# Patient Record
Sex: FEMALE | Race: WHITE | NOT HISPANIC OR LATINO | Employment: FULL TIME | URBAN - METROPOLITAN AREA
[De-identification: names, ages, dates, MRNs, and addresses within clinical notes are randomized per-mention and may not be internally consistent; named-entity substitution may affect disease eponyms.]

---

## 2017-01-25 ENCOUNTER — ALLSCRIPTS OFFICE VISIT (OUTPATIENT)
Dept: OTHER | Facility: OTHER | Age: 22
End: 2017-01-25

## 2017-01-25 DIAGNOSIS — R00.2 PALPITATIONS: ICD-10-CM

## 2017-02-08 ENCOUNTER — HOSPITAL ENCOUNTER (OUTPATIENT)
Dept: NON INVASIVE DIAGNOSTICS | Facility: CLINIC | Age: 22
Discharge: HOME | End: 2017-02-08
Payer: COMMERCIAL

## 2017-02-08 DIAGNOSIS — R00.2 PALPITATIONS: ICD-10-CM

## 2017-02-08 PROCEDURE — 93306 TTE W/DOPPLER COMPLETE: CPT

## 2017-02-23 ENCOUNTER — ALLSCRIPTS OFFICE VISIT (OUTPATIENT)
Dept: OTHER | Facility: OTHER | Age: 22
End: 2017-02-23

## 2017-03-01 ENCOUNTER — ALLSCRIPTS OFFICE VISIT (OUTPATIENT)
Dept: OTHER | Facility: OTHER | Age: 22
End: 2017-03-01

## 2017-06-28 ENCOUNTER — HOSPITAL ENCOUNTER (EMERGENCY)
Facility: HOSPITAL | Age: 22
Discharge: HOME | End: 2017-06-28
Attending: EMERGENCY MEDICINE
Payer: COMMERCIAL

## 2017-06-28 VITALS
WEIGHT: 164 LBS | SYSTOLIC BLOOD PRESSURE: 121 MMHG | BODY MASS INDEX: 30.18 KG/M2 | HEIGHT: 62 IN | OXYGEN SATURATION: 99 % | HEART RATE: 68 BPM | TEMPERATURE: 98.1 F | RESPIRATION RATE: 16 BRPM | DIASTOLIC BLOOD PRESSURE: 77 MMHG

## 2017-06-28 DIAGNOSIS — B86 SCABIES: Primary | ICD-10-CM

## 2017-06-28 PROCEDURE — 99282 EMERGENCY DEPT VISIT SF MDM: CPT

## 2017-06-28 RX ORDER — PERMETHRIN 50 MG/G
CREAM TOPICAL
Qty: 60 G | Refills: 0 | Status: SHIPPED | OUTPATIENT
Start: 2017-06-28 | End: 2017-06-28

## 2017-06-28 RX ORDER — PERMETHRIN 50 MG/G
CREAM TOPICAL
Qty: 60 G | Refills: 1 | Status: SHIPPED | OUTPATIENT
Start: 2017-06-28 | End: 2018-02-15 | Stop reason: ALTCHOICE

## 2018-01-12 VITALS
BODY MASS INDEX: 27.92 KG/M2 | WEIGHT: 163.56 LBS | HEIGHT: 64 IN | SYSTOLIC BLOOD PRESSURE: 118 MMHG | DIASTOLIC BLOOD PRESSURE: 70 MMHG

## 2018-01-13 VITALS
BODY MASS INDEX: 28.36 KG/M2 | WEIGHT: 166.13 LBS | DIASTOLIC BLOOD PRESSURE: 68 MMHG | HEART RATE: 89 BPM | SYSTOLIC BLOOD PRESSURE: 110 MMHG | HEIGHT: 64 IN

## 2018-01-14 VITALS
HEIGHT: 64 IN | OXYGEN SATURATION: 98 % | WEIGHT: 163.56 LBS | DIASTOLIC BLOOD PRESSURE: 82 MMHG | HEART RATE: 96 BPM | SYSTOLIC BLOOD PRESSURE: 128 MMHG | BODY MASS INDEX: 27.92 KG/M2

## 2018-02-15 ENCOUNTER — OFFICE VISIT (OUTPATIENT)
Dept: DERMATOLOGY | Facility: CLINIC | Age: 23
End: 2018-02-15
Payer: COMMERCIAL

## 2018-02-15 DIAGNOSIS — Z13.89 SCREENING FOR SKIN CONDITION: ICD-10-CM

## 2018-02-15 DIAGNOSIS — L20.89 FLEXURAL ATOPIC DERMATITIS: Primary | ICD-10-CM

## 2018-02-15 PROCEDURE — 99203 OFFICE O/P NEW LOW 30 MIN: CPT | Performed by: DERMATOLOGY

## 2018-02-15 RX ORDER — CEPHALEXIN 250 MG/5ML
POWDER, FOR SUSPENSION ORAL
Refills: 0 | COMMUNITY
Start: 2018-02-06 | End: 2018-08-08 | Stop reason: ALTCHOICE

## 2018-02-15 RX ORDER — FLUTICASONE PROPIONATE 0.05 %
CREAM (GRAM) TOPICAL 2 TIMES DAILY
Qty: 30 G | Refills: 2 | Status: SHIPPED | OUTPATIENT
Start: 2018-02-15 | End: 2018-08-08 | Stop reason: ALTCHOICE

## 2018-02-15 RX ORDER — AZITHROMYCIN 1 G
PACKET (EA) ORAL
Refills: 0 | COMMUNITY
Start: 2017-11-13 | End: 2018-08-08 | Stop reason: ALTCHOICE

## 2018-02-15 RX ORDER — ONDANSETRON 4 MG/1
TABLET, FILM COATED ORAL
Refills: 0 | COMMUNITY
Start: 2017-11-13 | End: 2018-08-08 | Stop reason: ALTCHOICE

## 2018-02-15 NOTE — PROGRESS NOTES
[address] [facility] OF [address] [facility] DERMATOLOGY  [address] [address]     MRN: [id number] : 1995  Encounter: [id number]  Patient Information: Elida Hermelindo  Chief complaint: Hand dermatitis    History of present illness:  80-year-old female without previous history of atopy herself but with strong family history presents secondary to a hand problem which has been present since last summer since patient has been working at a   Patient notes that she washes the hands constantly  Patient complaining of itching burning of the area has been treated for scabies for this eruption without any improvement  No past medical history on file  No past surgical history on file  Social History   History   Alcohol Use    Yes     Comment: social     History   Drug Use No     History   Smoking Status    Current Every Day Smoker    Packs/day: 0 50    Types: Cigarettes   Smokeless Tobacco    Never Used     Family History   Problem Relation Age of Onset    Diabetes Maternal Grandfather     Hypertension Maternal Grandfather     Diabetes Paternal Grandmother     Hypertension Paternal Grandmother     Cancer Paternal Grandmother     Cancer Paternal Grandfather      Meds/Allergies   No Known Allergies    Meds:  Prior to Admission medications    Medication Sig Start Date End Date Taking? Authorizing Provider   azithromycin (ZITHROMAX) 1 g powder TAKE 1 PACKET (1 G TOTAL) BY MOUTH ONCE FOR 1 DOSE  17   Historical Provider, MD   cephalexin (KEFLEX) 250 mg/5 mL suspension TAKE 10 ML (500 MG TOTAL) BY MOUTH EVERY 8 (EIGHT) HOURS FOR 7 DAYS  DISCARD ANY REMAINDER 18   Historical Provider, MD   ondansetron (ZOFRAN) 4 mg tablet TAKE 1 TABLET (4 MG TOTAL) BY MOUTH ONCE FOR 1 DOSE  17   Historical Provider, MD   permethrin (ELIMITE) 5 % cream Apply cream to entire body, leave on for 8-14 hours, then rinse off, then repeat in 1 week   17   Pinky Logan Vi Deleon MD       Subjective:     Review of Systems:    General: negative for - chills, fatigue, fever,  weight gain or weight loss  Psychological: negative for - anxiety, behavioral disorder, concentration difficulties, decreased libido, depression, irritability, memory difficulties, mood swings, sleep disturbances or suicidal ideation  ENT: negative for - hearing difficulties , nasal congestion, nasal discharge, oral lesions, sinus pain, sneezing, sore throat  Allergy and Immunology: negative for - hives, insect bite sensitivity,  Hematological and Lymphatic: negative for - bleeding problems, blood clots,bruising, swollen lymph nodes  Endocrine: negative for - hair pattern changes, hot flashes, malaise/lethargy, mood swings, palpitations, polydipsia/polyuria, skin changes, temperature intolerance or unexpected weight change  Respiratory: negative for - cough, hemoptysis, orthopnea, shortness of breath, or wheezing  Cardiovascular: negative for - chest pain, dyspnea on exertion, edema,  Gastrointestinal: negative for - abdominal pain, nausea/vomiting  Genito-Urinary: negative for - dysuria, incontinence, irregular/heavy menses or urinary frequency/urgency  Musculoskeletal: negative for - gait disturbance, joint pain, joint stiffness, joint swelling, muscle pain, muscular weakness  Dermatological:  As in HPI  Neurological: negative for confusion, dizziness, headaches, impaired coordination/balance, memory loss, numbness/tingling, seizures, speech problems, tremors or weakness       Objective: There were no vitals taken for this visit      Physical Exam:    General Appearance:    Alert, cooperative, no distress   Head:    Normocephalic, without obvious abnormality, atraumatic           Skin:   A full skin exam was performed including scalp, head scalp, eyes, ears, nose, lips, neck, chest, axilla, abdomen, back, , bilateral upper extremities, bilateral lower extremities, hands, feet, fingers, toes, fingernails, and toenails overall dry scaly skin noted with especially erythema scaling some fissures noted on the dorsum of her hands nothing else atypical noted     Assessment:     1  Flexural atopic dermatitis     2  Screening for skin condition           Plan:   Atopic dermatitis we discussed the concept of this process the the exacerbation triggered by the exposure to wet and dry elements  Also that the soaps and detergents that she is exposed to probably is playing a role we discussed use of moisturizes mild soaps avoidance of irritants will try her on topical steroids suggested wearing gloves as much as possible and will recheck if no improvement noted nothing else of concern noted on exam    Ford Coffey MD  2/15/2018,9:05 AM    Portions of the record may have been created with voice recognition software   Occasional wrong word or "sound a like" substitutions may have occurred due to the inherent limitations of voice recognition software   Read the chart carefully and recognize, using context, where substitutions have occurred

## 2018-02-15 NOTE — PATIENT INSTRUCTIONS
Eczema   WHAT YOU NEED TO KNOW:   Eczema, or atopic dermatitis, is an itchy, red skin rash  It is a long-term condition that may cause flare-ups for the rest of your life  DISCHARGE INSTRUCTIONS:   Return to the emergency department if:   · You develop a fever or have red streaks going up your arm or leg  · Your rash gets more swollen, red, or hot  Contact your healthcare provider if:   · Most of your skin is red, swollen, painful, and covered with scales  · You develop bloody, red, painful crusts  · Your skin blisters and oozes white or yellow pus  · You have questions about your condition or care  Medicines:   · Medicines , such as immunosuppressants, help reduce itching, redness, pain, and swelling  They may be given as a cream or pill  You may also receive antihistamines to reduce itching, or antibiotics if you have a skin infection  · Take your medicine as directed  Contact your healthcare provider if you think your medicine is not helping or if you have side effects  Tell him of her if you are allergic to any medicine  Keep a list of the medicines, vitamins, and herbs you take  Include the amounts, and when and why you take them  Bring the list or the pill bottles to follow-up visits  Carry your medicine list with you in case of an emergency  Manage eczema:   · Do not scratch  Pat or press on your skin for relief from itching  Your symptoms will get worse if you scratch  Keep your fingernails short so you do not tear your skin if you do scratch  · Keep your skin moist   Rub lotion, cream or ointment into your skin right after a bath or shower when your skin is still damp  Ask your healthcare provider what to use and how often to use it  · Take baths or showers  with warm water for 10 minutes or less  Use mild bar soap  Ask your healthcare provider for the best soap for you to use  · Wear cotton clothes  Wear loose-fitting clothes made from cotton or cotton blends  Avoid wool  · Use a humidifier  to add moisture to the air in your home  · Avoid changes in temperature , especially activities that cause you to sweat a lot because this can cause itching  Remove blankets from your bed if you get hot while you sleep  · Avoid allergens, dust, and skin irritants  Do not let pets inside your home  Do not use perfume, fabric softener, or makeup that burns or itches  Follow up with your healthcare provider as directed:  Write down your questions so you remember to ask them during your visits  © 2017 [facility] Information is for End User's use only and may not be sold, redistributed or otherwise used for commercial purposes  All illustrations and images included in CareNotes® are the copyrighted property of A D A M , Inc  or Mahad Andrews  The above information is an  only  It is not intended as medical advice for individual conditions or treatments  Talk to your doctor, nurse or pharmacist before following any medical regimen to see if it is safe and effective for you  Atopic dermatitis we discussed the concept of this process the the exacerbation triggered by the exposure to wet and dry elements    Also that the soaps and detergents that she is exposed to probably is playing a role we discussed use of moisturizes mild soaps avoidance of irritants will try her on topical steroids suggested wearing gloves as much as possible and will recheck if no improvement noted nothing else of concern noted on exam

## 2018-02-15 NOTE — LETTER
February 15, 2018     Patient: Vinay Troy   YOB: 1995   Date of Visit: 2/15/2018       To Whom it May Concern:    Vinay Troy is under my professional care  She was seen in my office on 2/15/2018  She can return to work on 02/15/2018  If you have any questions or concerns, please don't hesitate to call           Sincerely,          Esme Lubin MD        CC: No Recipients

## 2018-03-07 ENCOUNTER — APPOINTMENT (EMERGENCY)
Dept: ULTRASOUND IMAGING | Facility: HOSPITAL | Age: 23
End: 2018-03-07
Payer: COMMERCIAL

## 2018-03-07 ENCOUNTER — HOSPITAL ENCOUNTER (EMERGENCY)
Facility: HOSPITAL | Age: 23
Discharge: HOME | End: 2018-03-07
Attending: EMERGENCY MEDICINE
Payer: COMMERCIAL

## 2018-03-07 VITALS
HEART RATE: 108 BPM | HEIGHT: 62 IN | TEMPERATURE: 97.9 F | OXYGEN SATURATION: 100 % | RESPIRATION RATE: 16 BRPM | DIASTOLIC BLOOD PRESSURE: 84 MMHG | WEIGHT: 175 LBS | SYSTOLIC BLOOD PRESSURE: 155 MMHG | BODY MASS INDEX: 32.2 KG/M2

## 2018-03-07 DIAGNOSIS — N93.9 VAGINAL BLEEDING: Primary | ICD-10-CM

## 2018-03-07 DIAGNOSIS — Z34.90 PREGNANCY: ICD-10-CM

## 2018-03-07 LAB
ABO GROUP BLD: NORMAL
B-HCG SERPL-ACNC: 4148 MIU/ML
RH BLD: POSITIVE

## 2018-03-07 PROCEDURE — 99284 EMERGENCY DEPT VISIT MOD MDM: CPT

## 2018-03-07 PROCEDURE — 86900 BLOOD TYPING SEROLOGIC ABO: CPT | Performed by: EMERGENCY MEDICINE

## 2018-03-07 PROCEDURE — 76815 OB US LIMITED FETUS(S): CPT

## 2018-03-07 PROCEDURE — 86901 BLOOD TYPING SEROLOGIC RH(D): CPT | Performed by: EMERGENCY MEDICINE

## 2018-03-07 PROCEDURE — 36415 COLL VENOUS BLD VENIPUNCTURE: CPT | Performed by: EMERGENCY MEDICINE

## 2018-03-07 PROCEDURE — 84702 CHORIONIC GONADOTROPIN TEST: CPT | Performed by: EMERGENCY MEDICINE

## 2018-03-07 NOTE — ED PROVIDER NOTES
History  Chief Complaint   Patient presents with    Vaginal Bleeding - Pregnant     pt states she is 7 wks pregnant and begin spotting this AM along with mild cramping      Patient presents to the emergency department for evaluation of vaginal spotting that began today  Patient denies abdominal pain or back pain but states she is 7 weeks pregnant by dates  She has not seen private OBGYN yet but does have her 1st appointment on March 16th  She denies other vaginal discharge or urinary complaints  Denies nausea vomiting diarrhea  Denies fever chills  Has been eating and drinking normally  Has been moving her bowels normally  She again denies abdominal or inguinal pain or other pelvic pain  Prior to Admission Medications   Prescriptions Last Dose Informant Patient Reported? Taking? azithromycin (ZITHROMAX) 1 g powder   Yes No   Sig: TAKE 1 PACKET (1 G TOTAL) BY MOUTH ONCE FOR 1 DOSE  cephalexin (KEFLEX) 250 mg/5 mL suspension   Yes No   Sig: TAKE 10 ML (500 MG TOTAL) BY MOUTH EVERY 8 (EIGHT) HOURS FOR 7 DAYS  DISCARD ANY REMAINDER   fluticasone (CUTIVATE) 0 05 % cream   No No   Sig: Apply topically 2 (two) times a day   ondansetron (ZOFRAN) 4 mg tablet   Yes No   Sig: TAKE 1 TABLET (4 MG TOTAL) BY MOUTH ONCE FOR 1 DOSE  Facility-Administered Medications: None       No past medical history on file  No past surgical history on file  Family History   Problem Relation Age of Onset    Diabetes Maternal Grandfather     Hypertension Maternal Grandfather     Diabetes Paternal Grandmother     Hypertension Paternal Grandmother     Cancer Paternal Grandmother     Cancer Paternal Grandfather      I have reviewed and agree with the history as documented      Social History   Substance Use Topics    Smoking status: Current Some Day Smoker     Packs/day: 0 50     Types: Cigarettes    Smokeless tobacco: Never Used    Alcohol use No      Comment: social        Review of Systems Constitutional: Negative  Negative for activity change, appetite change, chills, diaphoresis, fatigue and fever  HENT: Negative  Negative for congestion  Eyes: Negative  Negative for photophobia and visual disturbance  Respiratory: Negative  Negative for cough, chest tightness, shortness of breath, wheezing and stridor  Cardiovascular: Negative  Negative for chest pain, palpitations and leg swelling  Gastrointestinal: Negative  Negative for abdominal pain, anal bleeding, constipation, diarrhea, nausea, rectal pain and vomiting  Endocrine: Negative  Genitourinary: Positive for vaginal bleeding  Negative for decreased urine volume, difficulty urinating, dyspareunia, dysuria, flank pain, frequency, hematuria, pelvic pain and urgency  Musculoskeletal: Negative  Negative for back pain, neck pain and neck stiffness  Skin: Negative  Negative for rash and wound  Allergic/Immunologic: Negative  Neurological: Negative  Negative for dizziness, tremors, seizures, speech difficulty, weakness, light-headedness and numbness  Hematological: Negative  Does not bruise/bleed easily  Psychiatric/Behavioral: Negative  Physical Exam  ED Triage Vitals [03/07/18 1434]   Temperature Pulse Respirations Blood Pressure SpO2   97 9 °F (36 6 °C) (!) 108 16 155/84 100 %      Temp Source Heart Rate Source Patient Position - Orthostatic VS BP Location FiO2 (%)   Oral Monitor Sitting Right arm --      Pain Score       No Pain           Orthostatic Vital Signs  Vitals:    03/07/18 1434   BP: 155/84   Pulse: (!) 108   Patient Position - Orthostatic VS: Sitting       Physical Exam   Constitutional: She is oriented to person, place, and time  She appears well-developed and well-nourished  Nontoxic appearance without respiratory distress  Patient looks comfortable sitting upright in the stretcher  HENT:   Head: Normocephalic and atraumatic     Right Ear: External ear normal    Left Ear: External ear normal    Mouth/Throat: Oropharynx is clear and moist    Eyes: Conjunctivae and EOM are normal  Pupils are equal, round, and reactive to light  Neck: Normal range of motion  Neck supple  Cardiovascular: Normal rate, regular rhythm, normal heart sounds and intact distal pulses  Pulmonary/Chest: Effort normal and breath sounds normal  No respiratory distress  She has no wheezes  She has no rales  She exhibits no tenderness  Abdominal: Soft  Bowel sounds are normal  She exhibits no distension and no mass  There is no tenderness  There is no rebound and no guarding  No hernia  No peritoneal signs   Musculoskeletal: Normal range of motion  She exhibits no edema, tenderness or deformity  Neurological: She is alert and oriented to person, place, and time  She has normal reflexes  She displays normal reflexes  No cranial nerve deficit or sensory deficit  She exhibits normal muscle tone  Coordination normal    Skin: Skin is warm and dry  No rash noted  No erythema  No pallor  Psychiatric: She has a normal mood and affect  Her behavior is normal  Judgment and thought content normal    Nursing note and vitals reviewed        ED Medications  Medications - No data to display    Diagnostic Studies  Results Reviewed     Procedure Component Value Units Date/Time    hCG, quantitative [[id number]]  (Abnormal) Collected:  03/07/18 1456    Lab Status:  Final result Specimen:  Blood from Arm, Right Updated:  03/07/18 1542     HCG, Quant 4,148 (H) mIU/mL     Narrative:          Expected Ranges:     Approximate               Approximate HCG  Gestation age          Concentration ( mIU/mL)  _____________          ______________________   Karyna Jennings                      HCG values  0 2-1                       5-50  1-2                           2-3                         100-5000  3-4                         500-70351  4-5                         1000-28071  5-6                         [id number]  6-8 [id number]  8-12                        [id number]                 US OB pregnancy limited with transvaginal   ED Interpretation by Corinne Corolla, MD (03/07 1556)   Viable intrauterine pregnancy dated at 6+ weeks  Normal fetal cardiac activity seen  No adnexal ectopic pregnancy  This was interpreted by Radiology  by Kylee Dias (03/07 1552)                 Procedures  Procedures       Phone Contacts  ED Phone Contact    ED Course  ED Course as of Mar 07 1556   Wed Mar 07, 2018   1551 Patient is stable for discharge  Her blood type is O positive and her ultrasound shows a viable fetus with good fetal heart tones and no adnexal or ectopic pregnancies  Patient will follow up with her private OBGYN physician  [facility]  CritCare Time    Disposition  Final diagnoses:   Vaginal bleeding   Pregnancy     Time reflects when diagnosis was documented in both MDM as applicable and the Disposition within this note     Time User Action Codes Description Comment    3/7/2018  3:52 PM Elio Kessler Add [N93 9] Vaginal bleeding     3/7/2018  3:52 PM Elio Kessler Add [P73 15] Pregnancy       ED Disposition     ED Disposition Condition Comment    Discharge  Radha Mcghee discharge to home/self care  Condition at discharge: Stable        Follow-up Information     Follow up With Specialties Details Why Contact Info    Private OBGYN  Schedule an appointment as soon as possible for a visit          Patient's Medications   Discharge Prescriptions    No medications on file     No discharge procedures on file      ED Provider  Electronically Signed by           Corinne Corolla, MD  03/07/18 [address] Darnell MD  03/07/18 0488

## 2018-03-07 NOTE — DISCHARGE INSTRUCTIONS
First Trimester Vaginal Bleed   WHAT YOU NEED TO KNOW:   First trimester vaginal bleed is bleeding that occurs during the first 13 weeks of your pregnancy  Your baby's heart rate and size are good  DISCHARGE INSTRUCTIONS:   Return the emergency department if:   · You have a fever  · You have pain or cramping in your abdomen or low back  · You have severe vaginal bleeding or clotting  · You pass material that looks like tissue or large clots  Collect the material and bring it with you  Contact your healthcare provider if:  You have questions or concerns about your condition or care  Self-care:   · Keep track of bleeding  Use sanitary pads to keep track of how much vaginal bleeding you are having  Do not use tampons  Keep a record of how many pads you use each day  · Ask about activity  You may need to rest, limit certain activities, or not have sex until your symptoms are better  Follow up with your healthcare provider as directed:  Write down your questions so you remember to ask them during your visits  © 2017 [facility] Information is for End User's use only and may not be sold, redistributed or otherwise used for commercial purposes  All illustrations and images included in CareNotes® are the copyrighted property of [employer] M [employer]  or Mahad Andrews  The above information is an  only  It is not intended as medical advice for individual conditions or treatments  Talk to your doctor, nurse or pharmacist before following any medical regimen to see if it is safe and effective for you  Pregnancy   WHAT YOU NEED TO KNOW:   What do I need to know about pregnancy? A normal pregnancy lasts about 40 weeks  The first trimester lasts from your last period through the 12th week of pregnancy  The second trimester lasts from the 13th week of your pregnancy through the 23rd week  The third trimester lasts from your 24th week of pregnancy until your baby is born  If you know the date of your last period, your healthcare provider can estimate your due date  You may give birth to your baby any time from 37 weeks to 2 weeks after your due date  What is prenatal care? Prenatal care is a series of visits with your healthcare provider throughout your pregnancy  Prenatal care can help prevent problems during pregnancy and childbirth  At each prenatal visit, your healthcare provider will weigh you and check your blood pressure  Your healthcare provider will also check your baby's heartbeat and growth  You may also need the following at some visits:  · A pelvic exam  allows your healthcare provider to see your cervix (the bottom part of your uterus)  Your healthcare provider uses a speculum to gently open your vagina  He will check the size and shape of your uterus  · Blood tests  may be done to check for gestational diabetes and anemia (low iron level)  You may need other blood tests, such as blood type, Rh factor, or tests to check for birth defects  · A fetal ultrasound  shows pictures of your baby inside your uterus  It shows your baby's development  The movement and position of your baby can also be seen  Your healthcare provider may be able to tell you what your baby's gender is during the ultrasound  What can I do to have a healthy pregnancy? · Eat a variety of healthy foods  Healthy foods include fruits, vegetables, whole-grain breads, low-fat dairy foods, beans, lean meats, and fish  Drink liquids as directed  Ask how much liquid to drink each day and which liquids are best for you  Limit caffeine to less than 200 milligrams each day  Limit your intake of fish to 2 servings each week  Choose fish low in mercury such as canned light tuna, shrimp, crab, salmon, cod, or tilapia  Do not  eat fish high in mercury such as swordfish, tilefish, carlos mackerel, and shark  · Take prenatal vitamins as directed    Your need for certain vitamins and minerals, such as folic acid, increases during pregnancy  Prenatal vitamins provide some of the extra vitamins and minerals you need  Prenatal vitamins may also help to decrease the risk of certain birth defects  · Ask how much weight you should gain during your pregnancy  Too much or too little weight gain can be unhealthy for you and your baby  · Talk to your healthcare provider about exercise  Moderate exercise can help you stay fit  Your healthcare provider will help you plan an exercise program that is safe for you during pregnancy  · Do not smoke  If you smoke, it is never too late to quit  Smoking increases your risk of a miscarriage and other health problems during your pregnancy  Smoking can cause your baby to be born too early or weigh less at birth  Ask your healthcare provider for information if you need help quitting  · Do not drink alcohol  Alcohol passes from your body to your baby through the placenta  It can affect your baby's brain development and cause fetal alcohol syndrome (FAS)  FAS is a group of conditions that causes mental, behavior, and growth problems  · Talk to your healthcare provider before you take any medicines  Many medicines may harm your baby if you take them when you are pregnant  Do not take any medicines, vitamins, herbs, or supplements without first talking to your healthcare provider  Never use illegal or street drugs (such as marijuana or cocaine) while you are pregnant  What body changes may happen during my pregnancy? · Breast changes  you will experience include tenderness and tingling during the early part of your pregnancy  Your breasts will become larger  You may need to use a support bra  You may see a thin, yellow fluid, called colostrum, leak from your nipples during the second trimester  Colostrum is a liquid that changes to milk about 3 days after you give birth  · Skin changes and stretch marks  may occur during your pregnancy   You may have red marks, called stretch marks, on your skin  Stretch marks will usually fade after pregnancy  Use lotion if your skin is dry and itchy  The skin on your face, around your nipples, and below your belly button may darken  Most of the time, your skin will return to its normal color after your baby is born  · Morning sickness  is nausea and vomiting that can happen at any time of day  Avoid fatty and spicy foods  Eat small meals throughout the day instead of large meals  Ruth may help to decrease nausea  Ask your healthcare provider about other ways of decreasing nausea and vomiting  · Heartburn  may be caused by changes in your hormones during pregnancy  Your growing uterus may also push your stomach upward and force stomach acid to back up into your esophagus  Eat 4 or 5 small meals each day instead of large meals  Avoid spicy foods  Avoid eating right before bedtime  · Constipation  may develop during your pregnancy  To treat constipation, eat foods high in fiber such as fiber cereals, beans, fruits, vegetables, whole-grain breads, and prune juice  Get regular exercise and drink plenty of water  Your healthcare provider may also suggest a fiber supplement to soften your bowel movements  Talk to your healthcare provider before you use any medicines to decrease constipation  · Hemorrhoids  are enlarged veins in the rectal area  They may cause pain, itching, and bright red bleeding from your rectum  To decrease your risk of hemorrhoids, prevent constipation and do not strain to have a bowel movement  If you have hemorrhoids, soak in a tub of warm water to ease discomfort  Ask your healthcare provider how you can treat hemorrhoids  · Leg cramps and swelling  may be caused by low calcium levels or the added weight of pregnancy  Raise your legs above the level of your heart to decrease swelling  During a leg cramp, stretch or massage the muscle that has the cramp  Heat may help decrease pain and muscle spasms   Apply heat on your muscle for 20 to 30 minutes every 2 hours for as many days as directed  · Back pain  may occur as your baby grows  Do not stand for long periods of time or lift heavy items  Use good posture while you stand, squat, or bend  Wear low-heeled shoes with good support  Rest may also help to relieve back pain  Ask your healthcare provider about exercises you can do to strengthen your back muscles  What are some safety tips during pregnancy? · Avoid hot tubs and saunas  Do not use a hot tub or sauna while you are pregnant, especially during your first trimester  Hot tubs and saunas may raise your baby's temperature and increase the risk of birth defects  · Avoid toxoplasmosis  This is an infection caused by eating raw meat or being around infected cat feces  It can cause birth defects, miscarriages, and other problems  Wash your hands after you touch raw meat  Make sure any meat is well-cooked before you eat it  Avoid raw eggs and unpasteurized milk  Use gloves or ask someone else to clean your cat's litter box while you are pregnant  · Ask your healthcare provider about travel  The most comfortable time to travel is during the second trimester  Ask your healthcare provider if you can travel after 36 weeks  You may not be able to travel in an airplane after 36 weeks  He may also recommend that you avoid long road trips  When should I seek immediate care? · You develop a severe headache that does not go away  · You have new or increased vision changes, such as blurred or spotted vision  · You have new or increased swelling in your face or hands  · You have pain or cramping in your abdomen or low back  · You have vaginal bleeding  When should I contact my healthcare provider? · You have abdominal cramps, pressure, or tightening  · You have a change in vaginal discharge  · You cannot keep food or drinks down, and you are losing weight      · You have chills or a fever     · You have vaginal itching, burning, or pain  · You have yellow, green, white, or foul-smelling vaginal discharge  · You have pain or burning when you urinate, less urine than usual, or pink or bloody urine  · You have questions or concerns about your condition or care  CARE AGREEMENT:   You have the right to help plan your care  Learn about your health condition and how it may be treated  Discuss treatment options with your caregivers to decide what care you want to receive  You always have the right to refuse treatment  The above information is an  only  It is not intended as medical advice for individual conditions or treatments  Talk to your doctor, nurse or pharmacist before following any medical regimen to see if it is safe and effective for you  © 2017 [address]  Information is for End User's use only and may not be sold, redistributed or otherwise used for commercial purposes  All illustrations and images included in CareNotes® are the copyrighted property of [employer] M [employer]  or Mahad Andrews

## 2018-04-06 ENCOUNTER — HOSPITAL ENCOUNTER (EMERGENCY)
Facility: HOSPITAL | Age: 23
Discharge: HOME | End: 2018-04-07
Attending: EMERGENCY MEDICINE | Admitting: EMERGENCY MEDICINE
Payer: COMMERCIAL

## 2018-04-06 DIAGNOSIS — O20.0 THREATENED MISCARRIAGE: Primary | ICD-10-CM

## 2018-04-06 DIAGNOSIS — R10.2 PELVIC CRAMPING: ICD-10-CM

## 2018-04-06 DIAGNOSIS — O20.9 VAGINAL BLEEDING BEFORE 22 WEEKS GESTATION: ICD-10-CM

## 2018-04-07 VITALS
HEIGHT: 62 IN | SYSTOLIC BLOOD PRESSURE: 120 MMHG | BODY MASS INDEX: 32.2 KG/M2 | TEMPERATURE: 98 F | RESPIRATION RATE: 18 BRPM | HEART RATE: 74 BPM | WEIGHT: 175 LBS | OXYGEN SATURATION: 98 % | DIASTOLIC BLOOD PRESSURE: 78 MMHG

## 2018-04-07 LAB
B-HCG SERPL-ACNC: 8181 MIU/ML
BACTERIA UR QL AUTO: ABNORMAL /HPF
BILIRUB UR QL STRIP: NEGATIVE
CLARITY UR: CLEAR
COLOR UR: YELLOW
GLUCOSE UR STRIP-MCNC: NEGATIVE MG/DL
HGB UR QL STRIP.AUTO: ABNORMAL
KETONES UR STRIP-MCNC: NEGATIVE MG/DL
LEUKOCYTE ESTERASE UR QL STRIP: NEGATIVE
NITRITE UR QL STRIP: NEGATIVE
NON-SQ EPI CELLS URNS QL MICRO: ABNORMAL /HPF
PH UR STRIP.AUTO: 7 [PH] (ref 4.5–8)
PROT UR STRIP-MCNC: NEGATIVE MG/DL
RBC #/AREA URNS AUTO: ABNORMAL /HPF
SP GR UR STRIP.AUTO: 1.01 (ref 1–1.03)
UROBILINOGEN UR QL STRIP.AUTO: 0.2 E.U./DL
WBC #/AREA URNS AUTO: ABNORMAL /HPF

## 2018-04-07 PROCEDURE — 36415 COLL VENOUS BLD VENIPUNCTURE: CPT | Performed by: EMERGENCY MEDICINE

## 2018-04-07 PROCEDURE — 84702 CHORIONIC GONADOTROPIN TEST: CPT | Performed by: EMERGENCY MEDICINE

## 2018-04-07 PROCEDURE — 81001 URINALYSIS AUTO W/SCOPE: CPT | Performed by: EMERGENCY MEDICINE

## 2018-04-07 PROCEDURE — 99284 EMERGENCY DEPT VISIT MOD MDM: CPT

## 2018-04-07 NOTE — DISCHARGE INSTRUCTIONS
Threatened Miscarriage   WHAT YOU NEED TO KNOW:   A threatened miscarriage occurs when you have vaginal bleeding within the first 20 weeks of pregnancy  It means that a miscarriage may happen  A threatened miscarriage may also be called a threatened   DISCHARGE INSTRUCTIONS:   Seek care immediately if:   · You feel weak or faint  · Your pain or cramping in your abdomen or back gets worse  · You have vaginal bleeding that soaks 1 or more pads in an hour  · You pass material that looks like tissue or large clots  Contact your healthcare provider or obstetrician if:   · You have a fever  · You have trouble urinating, burning when you urinate, or feel a need to urinate often  · You have new or worsening vaginal bleeding  · You have vaginal pain or itching, or vaginal discharge that is yellow, green, or foul-smelling  · You have questions or concerns about your condition or care  Self-care: The following may help you manage your symptoms and decrease your risk for a miscarriage:  · Do not put anything in your vagina  Do not have sex, douche, or use tampons  These actions may increase your risk for infection and miscarriage  · Rest as directed  Do not exercise or do strenuous activities  These activities may cause  labor or miscarriage  Ask your healthcare provider what activities are okay to do  Stay healthy during pregnancy:   · Eat a variety of healthy foods  Healthy foods can help you get extra protein, water, and calories that you need while you are pregnant  Healthy foods include fruits, vegetables, whole-grain breads, low-fat dairy products, beans, lean meats, and fish  Avoid raw or undercooked meat and fish  Ask your healthcare provider if you need a special diet  · Take prenatal vitamins as directed  These help you get the right amount of vitamins and minerals  They may also decrease the risk of certain birth defects      · Do not drink alcohol or use illegal drugs  These can increase your risk for a miscarriage or harm your baby  · Do not smoke  Nicotine and other chemicals in cigarettes and cigars can harm your baby and cause miscarriage or  labor  Ask your healthcare provider for information if you currently smoke and need help to quit  E-cigarettes or smokeless tobacco still contain nicotine  Do not use these products  · Decrease your risk for an infection  Always wash your hands before eating or preparing meals  Do not spend time with people who are sick  Ask your healthcare provider if you need immunizations such as the flu or hepatitis B vaccine  Immunizations may decrease your risk for infections that could cause a miscarriage  · Manage your medical conditions  Keep your blood pressure and blood sugars under control  Maintain a healthy weight during pregnancy  Follow up with your obstetrician as directed: You may need to see your obstetrician frequently for ultrasounds or blood tests  Write down your questions so you remember to ask them during your visits  ©  [address] [employer] Information is for End User's use only and may not be sold, redistributed or otherwise used for commercial purposes  All illustrations and images included in CareNotes® are the copyrighted property of [employer]  or Mahad Andrews  The above information is an  only  It is not intended as medical advice for individual conditions or treatments  Talk to your doctor, nurse or pharmacist before following any medical regimen to see if it is safe and effective for you  Miscarriage   WHAT YOU NEED TO KNOW:   A miscarriage is the loss of a fetus within the first 20 weeks of pregnancy  A miscarriage may also be called a spontaneous  or an early pregnancy loss  DISCHARGE INSTRUCTIONS:   Seek care immediately if:   · You have foul-smelling drainage or pus coming from your vagina      · You have heavy vaginal bleeding and soak 1 pad or more in an hour  · You have severe abdominal pain  · You feel like your heart is beating faster than normal      · You feel extremely weak or dizzy  Contact your healthcare provider if:   · You have a fever greater than 100 4°F or chills  · You have extreme sadness, grief, or feel unable to cope with what has happened  · You have questions or concerns about your condition or care  Self-care:   · Do not put anything in your vagina for 2 weeks or as directed  Do not use tampons, douche, or have sex  These actions can cause infection and pain  · Use sanitary pads as needed  You may have light bleeding or spotting for 2 weeks  · Do not take a bath or go swimming for 2 weeks or as directed  These actions may increase your risk for an infection  Take showers only  · Rest as needed  Slowly start to do more each day  Return to your daily activities as directed  · Talk to your healthcare provider about birth control  If you would like to prevent another pregnancy, ask your healthcare provider which type of birth control is best for you  · Join a support group or therapy to help you cope  A miscarriage may be very difficult for you, your partner, and other members of your family  There is no right way to feel after a miscarriage  You may feel overwhelming grief or other emotions  It may be helpful to talk to a friend, family member, or counselor about your feelings  You may worry that you could have another miscarriage  Talk to your healthcare provider about your concerns  He may be able to help you reduce the risk for another miscarriage  He may also help you find ways to cope with grief  For more information:   · The [address] [place] [address] Obstetricians and Gynecologists   [facility]  [address]  Phone: [phone]  Phone: [phone]  Web Address: [email or web address]  [email or web address]  · [facility] [address] [facility]  Web Address: [email or web address]-social  Follow up with your healthcare provider as directed: You may need to see your healthcare provider for blood tests or an ultrasound  Write down your questions so you remember to ask them during your visits  © 2017 [address] [employer] Information is for End User's use only and may not be sold, redistributed or otherwise used for commercial purposes  All illustrations and images included in CareNotes® are the copyrighted property of [employer]  or Mahad Andrews  The above information is an  only  It is not intended as medical advice for individual conditions or treatments  Talk to your doctor, nurse or pharmacist before following any medical regimen to see if it is safe and effective for you

## 2018-08-05 ENCOUNTER — HOSPITAL ENCOUNTER (EMERGENCY)
Facility: HOSPITAL | Age: 23
Discharge: HOME | End: 2018-08-05
Attending: EMERGENCY MEDICINE
Payer: COMMERCIAL

## 2018-08-05 VITALS
RESPIRATION RATE: 18 BRPM | HEART RATE: 93 BPM | TEMPERATURE: 98.4 F | SYSTOLIC BLOOD PRESSURE: 143 MMHG | DIASTOLIC BLOOD PRESSURE: 72 MMHG | OXYGEN SATURATION: 98 %

## 2018-08-05 DIAGNOSIS — L02.31 CELLULITIS AND ABSCESS OF BUTTOCK: Primary | ICD-10-CM

## 2018-08-05 DIAGNOSIS — L03.317 CELLULITIS AND ABSCESS OF BUTTOCK: Primary | ICD-10-CM

## 2018-08-05 PROCEDURE — 99282 EMERGENCY DEPT VISIT SF MDM: CPT

## 2018-08-05 RX ORDER — SULFAMETHOXAZOLE AND TRIMETHOPRIM 800; 160 MG/1; MG/1
1 TABLET ORAL ONCE
Status: COMPLETED | OUTPATIENT
Start: 2018-08-05 | End: 2018-08-05

## 2018-08-05 RX ORDER — CEPHALEXIN 250 MG/5ML
500 POWDER, FOR SUSPENSION ORAL ONCE
Status: COMPLETED | OUTPATIENT
Start: 2018-08-05 | End: 2018-08-05

## 2018-08-05 RX ORDER — SULFAMETHOXAZOLE AND TRIMETHOPRIM 800; 160 MG/1; MG/1
1 TABLET ORAL 2 TIMES DAILY
Qty: 14 TABLET | Refills: 0 | Status: SHIPPED | OUTPATIENT
Start: 2018-08-05 | End: 2018-08-12

## 2018-08-05 RX ORDER — CEPHALEXIN 250 MG/5ML
500 POWDER, FOR SUSPENSION ORAL EVERY 6 HOURS SCHEDULED
Qty: 100 ML | Refills: 0 | Status: SHIPPED | OUTPATIENT
Start: 2018-08-05 | End: 2018-08-12

## 2018-08-05 RX ADMIN — CEPHALEXIN 500 MG: 250 POWDER, FOR SUSPENSION ORAL at 22:00

## 2018-08-05 RX ADMIN — SULFAMETHOXAZOLE AND TRIMETHOPRIM 1 TABLET: 800; 160 TABLET ORAL at 21:59

## 2018-08-06 NOTE — DISCHARGE INSTRUCTIONS
Abscess   WHAT YOU NEED TO KNOW:   A warm compress may help your abscess drain  Your healthcare provider may make a cut in the abscess so it can drain  You may need surgery to remove an abscess that is on your hands or buttocks  DISCHARGE INSTRUCTIONS:   Return to the emergency department if:   · The area around your abscess becomes very painful, warm, or has red streaks  · You have a fever and chills  · Your heart is beating faster than usual      · You feel faint or confused  Contact your healthcare provider if:   · Your abscess gets bigger or does not get better  · Your abscess returns  · You have questions or concerns about your condition or care  Medicines: You may  need any of the following:  · Antibiotics  help treat a bacterial infection  · Acetaminophen  decreases pain and fever  It is available without a doctor's order  Ask how much to take and how often to take it  Follow directions  Acetaminophen can cause liver damage if not taken correctly  · NSAIDs , such as ibuprofen, help decrease swelling, pain, and fever  This medicine is available with or without a doctor's order  NSAIDs can cause stomach bleeding or kidney problems in certain people  If you take blood thinner medicine, always ask your healthcare provider if NSAIDs are safe for you  Always read the medicine label and follow directions  · Take your medicine as directed  Contact your healthcare provider if you think your medicine is not helping or if you have side effects  Tell him or her if you are allergic to any medicine  Keep a list of the medicines, vitamins, and herbs you take  Include the amounts, and when and why you take them  Bring the list or the pill bottles to follow-up visits  Carry your medicine list with you in case of an emergency  Self-care:   · Apply a warm compress to your abscess  This will help it open and drain  Wet a washcloth in warm, but not hot, water  Apply the compress for 10 minutes  Repeat this 4 times each day  Do not  press on an abscess or try to open it with a needle  You may push the bacteria deeper or into your blood  · Do not share your clothes, towels, or sheets with anyone  This can spread the infection to others  · Wash your hands often  This can help prevent the spread of germs  Use soap and water or an alcohol-based hand rub  Care for your wound after it is drained:   · Care for your wound as directed  If your healthcare provider says it is okay, carefully remove the bandage and gauze packing  You may need to soak the gauze to get it out of your wound  Clean your wound and the area around it as directed  Dry the area and put on new, clean bandages  Change your bandages when they get wet or dirty  · Ask your healthcare provider how to change the gauze in your wound  Keep track of how many pieces of gauze are placed inside the wound  Do not put too much packing in the wound  Do not pack the gauze too tightly in your wound  Follow up with your healthcare provider in 1 to 3 days: You may need to have your packing removed or your bandage changed  Write down your questions so you remember to ask them during your visits  © 2017 [employer] [facility] Information is for End User's use only and may not be sold, redistributed or otherwise used for commercial purposes  All illustrations and images included in CareNotes® are the copyrighted property of [employer]  or Mahad Andrews  The above information is an  only  It is not intended as medical advice for individual conditions or treatments  Talk to your doctor, nurse or pharmacist before following any medical regimen to see if it is safe and effective for you  Cellulitis   WHAT YOU NEED TO KNOW:   Cellulitis is a skin infection caused by bacteria  Cellulitis may go away on its own or you may need treatment   Your healthcare provider may draw a Andreafski around the outside edges of your cellulitis  If your cellulitis spreads, your healthcare provider will see it outside of the [place]  DISCHARGE INSTRUCTIONS:   Call 911 if:   · You have sudden trouble breathing or chest pain  Return to the emergency department if:   · Your wound gets larger and more painful  · You feel a crackling under your skin when you touch it  · You have purple dots or bumps on your skin, or you see bleeding under your skin  · You have new swelling and pain in your legs  · The red, warm, swollen area gets larger  · You see red streaks coming from the infected area  Contact your healthcare provider if:   · You have a fever  · Your fever or pain does not go away or gets worse  · The area does not get smaller after 2 days of antibiotics  · Your skin is flaking or peeling off  · You have questions or concerns about your condition or care  Medicines:   · Antibiotics  help treat the bacterial infection  · NSAIDs , such as ibuprofen, help decrease swelling, pain, and fever  NSAIDs can cause stomach bleeding or kidney problems in certain people  If you take blood thinner medicine, always ask if NSAIDs are safe for you  Always read the medicine label and follow directions  Do not give these medicines to children under 10months of age without direction from your child's healthcare provider  · Acetaminophen  decreases pain and fever  It is available without a doctor's order  Ask how much to take and how often to take it  Follow directions  Read the labels of all other medicines you are using to see if they also contain acetaminophen, or ask your doctor or pharmacist  Acetaminophen can cause liver damage if not taken correctly  Do not use more than 4 grams (4,000 milligrams) total of acetaminophen in one day  · Take your medicine as directed  Contact your healthcare provider if you think your medicine is not helping or if you have side effects   Tell him or her if you are allergic to any medicine  Keep a list of the medicines, vitamins, and herbs you take  Include the amounts, and when and why you take them  Bring the list or the pill bottles to follow-up visits  Carry your medicine list with you in case of an emergency  Self-care:   · Elevate the area above the level of your heart  as often as you can  This will help decrease swelling and pain  Prop the area on pillows or blankets to keep it elevated comfortably  · Clean the area daily until the wound scabs over  Gently wash the area with soap and water  Pat dry  Use dressings as directed  · Place cool or warm, wet cloths on the area as directed  Use clean cloths and clean water  Leave it on the area until the cloth is room temperature  Pat the area dry with a clean, dry cloth  The cloths may help decrease pain  Prevent cellulitis:   · Do not scratch bug bites or areas of injury  You increase your risk for cellulitis by scratching these areas  · Do not share personal items, such as towels, clothing, and razors  · Clean exercise equipment  with germ-killing  before and after you use it  · Wash your hands often  Use soap and water  Wash your hands after you use the bathroom, change a child's diapers, or sneeze  Wash your hands before you prepare or eat food  Use lotion to prevent dry, cracked skin  · Wear pressure stockings as directed  You may be told to wear the stockings if you have peripheral edema  The stockings improve blood flow and decrease swelling  · Treat athlete's foot  This can help prevent the spread of a bacterial skin infection  Follow up with your healthcare provider within 3 days, or as directed: Your healthcare provider will check if your cellulitis is getting better  You may need different medicine  Write down your questions so you remember to ask them during your visits    © 2017 [employer] Information is for End User's use only and may not be sold, redistributed or otherwise used for commercial purposes  All illustrations and images included in CareNotes® are the copyrighted property of [employer]  or Mahad Andrews  The above information is an  only  It is not intended as medical advice for individual conditions or treatments  Talk to your doctor, nurse or pharmacist before following any medical regimen to see if it is safe and effective for you

## 2018-08-07 ENCOUNTER — HOSPITAL ENCOUNTER (EMERGENCY)
Facility: HOSPITAL | Age: 23
Discharge: HOME | End: 2018-08-07
Attending: EMERGENCY MEDICINE | Admitting: EMERGENCY MEDICINE
Payer: COMMERCIAL

## 2018-08-07 ENCOUNTER — APPOINTMENT (EMERGENCY)
Dept: CT IMAGING | Facility: HOSPITAL | Age: 23
End: 2018-08-07
Payer: COMMERCIAL

## 2018-08-07 VITALS
SYSTOLIC BLOOD PRESSURE: 117 MMHG | TEMPERATURE: 99.7 F | HEIGHT: 62 IN | OXYGEN SATURATION: 100 % | HEART RATE: 82 BPM | WEIGHT: 175.04 LBS | BODY MASS INDEX: 32.21 KG/M2 | DIASTOLIC BLOOD PRESSURE: 72 MMHG | RESPIRATION RATE: 18 BRPM

## 2018-08-07 DIAGNOSIS — L02.31 ABSCESS OF RIGHT BUTTOCK: Primary | ICD-10-CM

## 2018-08-07 LAB
ANION GAP SERPL CALCULATED.3IONS-SCNC: 8 MMOL/L (ref 4–13)
BASOPHILS # BLD AUTO: 0.04 THOUSANDS/ΜL (ref 0–0.1)
BASOPHILS NFR BLD AUTO: 0 % (ref 0–1)
BUN SERPL-MCNC: 19 MG/DL (ref 5–25)
CALCIUM SERPL-MCNC: 9 MG/DL (ref 8.3–10.1)
CHLORIDE SERPL-SCNC: 101 MMOL/L (ref 100–108)
CO2 SERPL-SCNC: 29 MMOL/L (ref 21–32)
CREAT SERPL-MCNC: 0.91 MG/DL (ref 0.6–1.3)
EOSINOPHIL # BLD AUTO: 0.06 THOUSAND/ΜL (ref 0–0.61)
EOSINOPHIL NFR BLD AUTO: 1 % (ref 0–6)
ERYTHROCYTE [DISTWIDTH] IN BLOOD BY AUTOMATED COUNT: 12.6 % (ref 11.6–15.1)
EXT PREG TEST URINE: NEGATIVE
GFR SERPL CREATININE-BSD FRML MDRD: 90 ML/MIN/1.73SQ M
GLUCOSE SERPL-MCNC: 96 MG/DL (ref 65–140)
HCT VFR BLD AUTO: 45.6 % (ref 34.8–46.1)
HGB BLD-MCNC: 14.4 G/DL (ref 11.5–15.4)
IMM GRANULOCYTES # BLD AUTO: 0.08 THOUSAND/UL (ref 0–0.2)
IMM GRANULOCYTES NFR BLD AUTO: 1 % (ref 0–2)
LYMPHOCYTES # BLD AUTO: 1.71 THOUSANDS/ΜL (ref 0.6–4.47)
LYMPHOCYTES NFR BLD AUTO: 17 % (ref 14–44)
MCH RBC QN AUTO: 28.9 PG (ref 26.8–34.3)
MCHC RBC AUTO-ENTMCNC: 31.6 G/DL (ref 31.4–37.4)
MCV RBC AUTO: 92 FL (ref 82–98)
MONOCYTES # BLD AUTO: 0.85 THOUSAND/ΜL (ref 0.17–1.22)
MONOCYTES NFR BLD AUTO: 9 % (ref 4–12)
NEUTROPHILS # BLD AUTO: 7.21 THOUSANDS/ΜL (ref 1.85–7.62)
NEUTS SEG NFR BLD AUTO: 72 % (ref 43–75)
NRBC BLD AUTO-RTO: 0 /100 WBCS
PLATELET # BLD AUTO: 183 THOUSANDS/UL (ref 149–390)
PMV BLD AUTO: 9.2 FL (ref 8.9–12.7)
POTASSIUM SERPL-SCNC: 3.5 MMOL/L (ref 3.5–5.3)
RBC # BLD AUTO: 4.98 MILLION/UL (ref 3.81–5.12)
SODIUM SERPL-SCNC: 138 MMOL/L (ref 136–145)
WBC # BLD AUTO: 9.95 THOUSAND/UL (ref 4.31–10.16)

## 2018-08-07 PROCEDURE — 99284 EMERGENCY DEPT VISIT MOD MDM: CPT

## 2018-08-07 PROCEDURE — 85025 COMPLETE CBC W/AUTO DIFF WBC: CPT | Performed by: PHYSICIAN ASSISTANT

## 2018-08-07 PROCEDURE — 81025 URINE PREGNANCY TEST: CPT | Performed by: PHYSICIAN ASSISTANT

## 2018-08-07 PROCEDURE — 87186 SC STD MICRODIL/AGAR DIL: CPT | Performed by: PHYSICIAN ASSISTANT

## 2018-08-07 PROCEDURE — 87205 SMEAR GRAM STAIN: CPT | Performed by: PHYSICIAN ASSISTANT

## 2018-08-07 PROCEDURE — 36415 COLL VENOUS BLD VENIPUNCTURE: CPT | Performed by: PHYSICIAN ASSISTANT

## 2018-08-07 PROCEDURE — 87147 CULTURE TYPE IMMUNOLOGIC: CPT | Performed by: PHYSICIAN ASSISTANT

## 2018-08-07 PROCEDURE — 80048 BASIC METABOLIC PNL TOTAL CA: CPT | Performed by: PHYSICIAN ASSISTANT

## 2018-08-07 PROCEDURE — 87070 CULTURE OTHR SPECIMN AEROBIC: CPT | Performed by: PHYSICIAN ASSISTANT

## 2018-08-07 RX ORDER — LIDOCAINE HYDROCHLORIDE AND EPINEPHRINE 10; 10 MG/ML; UG/ML
10 INJECTION, SOLUTION INFILTRATION; PERINEURAL ONCE
Status: COMPLETED | OUTPATIENT
Start: 2018-08-07 | End: 2018-08-07

## 2018-08-07 RX ORDER — CEPHALEXIN 500 MG/1
500 CAPSULE ORAL 3 TIMES DAILY
Qty: 15 CAPSULE | Refills: 0 | Status: SHIPPED | OUTPATIENT
Start: 2018-08-07 | End: 2018-08-07

## 2018-08-07 RX ORDER — CEPHALEXIN 250 MG/5ML
10 POWDER, FOR SUSPENSION ORAL 3 TIMES DAILY
Qty: 150 ML | Refills: 0 | Status: SHIPPED | OUTPATIENT
Start: 2018-08-07 | End: 2018-08-08 | Stop reason: ALTCHOICE

## 2018-08-07 RX ORDER — SULFAMETHOXAZOLE AND TRIMETHOPRIM 800; 160 MG/1; MG/1
1 TABLET ORAL 2 TIMES DAILY
Qty: 10 TABLET | Refills: 0 | Status: SHIPPED | OUTPATIENT
Start: 2018-08-07 | End: 2018-08-12

## 2018-08-07 RX ORDER — CHLORHEXIDINE GLUCONATE 4 G/100ML
1 SOLUTION TOPICAL DAILY PRN
Qty: 120 ML | Refills: 0 | Status: SHIPPED | OUTPATIENT
Start: 2018-08-07 | End: 2020-02-10 | Stop reason: ALTCHOICE

## 2018-08-07 RX ORDER — OXYCODONE HYDROCHLORIDE AND ACETAMINOPHEN 5; 325 MG/1; MG/1
1 TABLET ORAL EVERY 6 HOURS PRN
Qty: 12 TABLET | Refills: 0 | Status: SHIPPED | OUTPATIENT
Start: 2018-08-07 | End: 2018-08-10

## 2018-08-07 RX ORDER — CEPHALEXIN 250 MG/5ML
10 POWDER, FOR SUSPENSION ORAL 3 TIMES DAILY
Qty: 150 ML | Refills: 0 | Status: SHIPPED | OUTPATIENT
Start: 2018-08-07 | End: 2018-08-12

## 2018-08-07 RX ADMIN — IOHEXOL 100 ML: 350 INJECTION, SOLUTION INTRAVENOUS at 13:41

## 2018-08-07 RX ADMIN — LIDOCAINE HYDROCHLORIDE,EPINEPHRINE BITARTRATE 10 ML: 10; .01 INJECTION, SOLUTION INFILTRATION; PERINEURAL at 14:28

## 2018-08-07 NOTE — ED PROVIDER NOTES
History  Chief Complaint   Patient presents with    Cellulitis     Patient states she was seen here on Sunday, diagnosed with cellulits, is currently taking PO antibiotics with no improvement  Patient states cellulitis is spreading  72-year-old female with no significant past medical history presents to the emergency department with chief complaint of redness and swelling to right buttock  Onset of symptoms reported as 4 days ago  Location of symptoms reported as the right buttock  Quality is reported as localized redness swelling and pressure  Severity is reported as moderate  Associated symptoms:  Denies fevers or chills  Denies nausea or vomiting  Denies diarrhea or constipation  Denies local trauma  Denies lower extremity paralysis paresthesias or weakness  Denies low back pain  Denies urinary retention  Denies bowel or bladder incontinence  Modifying factors:  Patient reports sitting or local pressure on the area exacerbates pain  Context:  Patient reports she was seen in the emergency department for similar symptoms 3 days ago, she reports she had an ultrasound of the area which showed an abscess which was described to be too early to drain  She reports she has been taking antibiotics for the past 2 days but the redness, swelling and pain are increasing  She denies prior similar episodes in the past   She denies history of diabetes  She is a cigarette smoker  Denies recent sick contacts with similar symptoms  She does report that she has recently also had small raised red pustules to her bilateral lower extremities  Since starting the antibiotics these have improved  Medical summary:  Reviewed past visits via epic: patient last seen in ED on 8/5/2018 for initial evaluation of cellulitis of buttock  History provided by:  Patient and significant other   used: No        Prior to Admission Medications   Prescriptions Last Dose Informant Patient Reported? Taking? azithromycin (ZITHROMAX) 1 g powder   Yes No   Sig: TAKE 1 PACKET (1 G TOTAL) BY MOUTH ONCE FOR 1 DOSE  cephalexin (KEFLEX) 250 mg/5 mL suspension   Yes No   Sig: TAKE 10 ML (500 MG TOTAL) BY MOUTH EVERY 8 (EIGHT) HOURS FOR 7 DAYS  DISCARD ANY REMAINDER   cephalexin (KEFLEX) 250 mg/5 mL suspension   No No   Sig: Take 10 mL (500 mg total) by mouth every 6 (six) hours for 7 days   fluticasone (CUTIVATE) 0 05 % cream   No No   Sig: Apply topically 2 (two) times a day   ondansetron (ZOFRAN) 4 mg tablet   Yes No   Sig: TAKE 1 TABLET (4 MG TOTAL) BY MOUTH ONCE FOR 1 DOSE    sulfamethoxazole-trimethoprim (BACTRIM DS) 800-160 mg per tablet   No No   Sig: Take 1 tablet by mouth 2 (two) times a day for 7 days smx-tmp DS (BACTRIM) 800-160 mg tabs (1tab q12 D10)      Facility-Administered Medications: None       History reviewed  No pertinent past medical history  History reviewed  No pertinent surgical history  Family History   Problem Relation Age of Onset    Diabetes Maternal Grandfather     Hypertension Maternal Grandfather     Diabetes Paternal Grandmother     Hypertension Paternal Grandmother     Cancer Paternal Grandmother     Cancer Paternal Grandfather      I have reviewed and agree with the history as documented  Social History   Substance Use Topics    Smoking status: Current Some Day Smoker     Packs/day: 0 50     Types: Cigarettes    Smokeless tobacco: Never Used    Alcohol use No      Comment: social        Review of Systems   Constitutional: Negative for activity change, appetite change, chills, diaphoresis, fatigue and fever  HENT: Negative for congestion, dental problem, drooling, ear discharge, ear pain, facial swelling, hearing loss, mouth sores, nosebleeds, postnasal drip, rhinorrhea, sinus pain, sinus pressure, sneezing, sore throat, tinnitus, trouble swallowing and voice change  Eyes: Negative for photophobia, pain, discharge, redness and itching     Respiratory: Negative for apnea, cough, choking, chest tightness, shortness of breath, wheezing and stridor  Cardiovascular: Negative for chest pain, palpitations and leg swelling  Gastrointestinal: Negative for abdominal distention, abdominal pain, anal bleeding, blood in stool, constipation, diarrhea, nausea, rectal pain and vomiting  Endocrine: Negative for cold intolerance, heat intolerance, polydipsia, polyphagia and polyuria  Genitourinary: Negative for decreased urine volume, difficulty urinating, dysuria, flank pain, frequency, hematuria, menstrual problem, pelvic pain, urgency, vaginal bleeding, vaginal discharge and vaginal pain  Musculoskeletal: Negative for arthralgias, back pain, gait problem, joint swelling, myalgias, neck pain and neck stiffness  Skin: Positive for wound  Negative for color change, pallor and rash  Allergic/Immunologic: Negative for environmental allergies, food allergies and immunocompromised state  Neurological: Negative for dizziness, tremors, seizures, syncope, facial asymmetry, speech difficulty, weakness, light-headedness, numbness and headaches  Hematological: Negative for adenopathy  Does not bruise/bleed easily  Psychiatric/Behavioral: Negative for agitation, confusion, decreased concentration and hallucinations  The patient is not nervous/anxious  All other systems reviewed and are negative  Physical Exam  Physical Exam   Constitutional: She is oriented to person, place, and time  She appears well-developed and well-nourished  No distress  /72 (BP Location: Right arm)   Pulse 82   Temp 99 7 °F (37 6 °C) (Oral)   Resp 18   Ht 5' 2" (1 575 m)   Wt 79 4 kg (175 lb 0 7 oz)   LMP 07/21/2018 (Exact Date)   SpO2 100%   BMI 32 02 kg/m²    HENT:   Head: Normocephalic and atraumatic  Right Ear: External ear normal    Left Ear: External ear normal    Nose: Nose normal    Mouth/Throat: Oropharynx is clear and moist  No oropharyngeal exudate     Eyes: Conjunctivae and EOM are normal  Pupils are equal, round, and reactive to light  Right eye exhibits no discharge  Left eye exhibits no discharge  No scleral icterus  Neck: Normal range of motion  Neck supple  No JVD present  No tracheal deviation present  Cardiovascular: Normal rate, regular rhythm and intact distal pulses  Pulmonary/Chest: Effort normal and breath sounds normal  No respiratory distress  She has no wheezes  She exhibits no tenderness  Abdominal: Soft  Bowel sounds are normal  She exhibits no distension and no mass  There is no tenderness  There is no rebound and no guarding  No hernia  Musculoskeletal: Normal range of motion  She exhibits no edema, tenderness or deformity  Lymphadenopathy:     She has no cervical adenopathy  Neurological: She is alert and oriented to person, place, and time  She displays normal reflexes  No cranial nerve deficit or sensory deficit  She exhibits normal muscle tone  Coordination normal    Skin: Skin is warm and dry  Capillary refill takes less than 2 seconds  No rash noted  She is not diaphoretic  There is erythema  No pallor  There is a circular area 3 5 cm area of erythema, warmth induration fluctuance and swelling with central pustule consistent with abscess present to inferior aspect of right buttock  Does not appear perianal   No drainage  There are a few small scattered pustules associated with hair follicles present to bilateral lower legs  No petechiae, no vesicles  Psychiatric: She has a normal mood and affect  Her behavior is normal  Judgment and thought content normal    Nursing note and vitals reviewed        Vital Signs  ED Triage Vitals   Temperature Pulse Respirations Blood Pressure SpO2   08/07/18 1142 08/07/18 1142 08/07/18 1142 08/07/18 1142 08/07/18 1142   99 7 °F (37 6 °C) 90 18 123/78 99 %      Temp Source Heart Rate Source Patient Position - Orthostatic VS BP Location FiO2 (%)   08/07/18 1142 08/07/18 1142 08/07/18 1430 08/07/18 1430 -- Oral Monitor Lying Right arm       Pain Score       --                  Vitals:    08/07/18 1142 08/07/18 1430   BP: 123/78 117/72   Pulse: 90 82   Patient Position - Orthostatic VS:  Lying       Visual Acuity      ED Medications  Medications   iohexol (OMNIPAQUE) 350 MG/ML injection (MULTI-DOSE) 100 mL (100 mL Intravenous Given 8/7/18 1341)   lidocaine-epinephrine (XYLOCAINE/EPINEPHRINE) 1 %-1:100,000 injection 10 mL (10 mL Infiltration Given by Other 8/7/18 1428)       Diagnostic Studies  Results Reviewed     Procedure Component Value Units Date/Time    Wound culture and Gram stain [[id number]] Collected:  08/07/18 1513    Lab Status: In process Specimen:  Wound from Buttock Updated:  08/07/18 3753    Basic metabolic panel [[id number]] Collected:  08/07/18 1255    Lab Status:  Final result Specimen:  Blood from Arm, Right Updated:  08/07/18 1311     Sodium 138 mmol/L      Potassium 3 5 mmol/L      Chloride 101 mmol/L      CO2 29 mmol/L      Anion Gap 8 mmol/L      BUN 19 mg/dL      Creatinine 0 91 mg/dL      Glucose 96 mg/dL      Calcium 9 0 mg/dL      eGFR 90 ml/min/1 73sq m     Narrative:         National Kidney Disease Education Program recommendations are as follows:  GFR calculation is accurate only with a steady state creatinine  Chronic Kidney disease less than 60 ml/min/1 73 sq  meters  Kidney failure less than 15 ml/min/1 73 sq  meters      CBC and differential [[id number]] Collected:  08/07/18 1255    Lab Status:  Final result Specimen:  Blood from Arm, Right Updated:  08/07/18 1303     WBC 9 95 Thousand/uL      RBC 4 98 Million/uL      Hemoglobin 14 4 g/dL      Hematocrit 45 6 %      MCV 92 fL      MCH 28 9 pg      MCHC 31 6 g/dL      RDW 12 6 %      MPV 9 2 fL      Platelets 342 Thousands/uL      nRBC 0 /100 WBCs      Neutrophils Relative 72 %      Immat GRANS % 1 %      Lymphocytes Relative 17 %      Monocytes Relative 9 %      Eosinophils Relative 1 %      Basophils Relative 0 %      Neutrophils Absolute 7 21 Thousands/µL      Immature Grans Absolute 0 08 Thousand/uL      Lymphocytes Absolute 1 71 Thousands/µL      Monocytes Absolute 0 85 Thousand/µL      Eosinophils Absolute 0 06 Thousand/µL      Basophils Absolute 0 04 Thousands/µL     POCT pregnancy, urine [[id number]]  (Normal) Resulted:  08/07/18 1256    Lab Status:  Final result Updated:  08/07/18 1256     EXT PREG TEST UR (Ref: Negative) negative                 No orders to display              Procedures  Incision/Drainage  Date/Time: 8/7/2018 3:11 PM  Performed by: Oleg Cheung  Authorized by: Oleg Cheung     Patient location:  ED  Other Assisting Provider: No    Consent:     Consent obtained:  Verbal    Consent given by:  Patient    Risks discussed:  Bleeding, damage to other organs, infection, incomplete drainage and pain    Alternatives discussed:  No treatment  Universal protocol:     Patient identity confirmed:  Verbally with patient  Location:     Type:  Abscess    Size:  3    Location:  Anogenital (right buttock)  Anesthesia (see MAR for exact dosages): Anesthesia method:  Local infiltration    Local anesthetic:  Lidocaine 1% WITH epi  Procedure details:     Complexity:  Complex    Needle aspiration: yes      Needle size:  25 G    Incision types:  Elliptical    Scalpel blade:  11    Approach:  Puncture    Incision depth:  Skin    Wound management:  Probed and deloculated, irrigated with saline, extensive cleaning and debrided    Irrigation with saline:  Yes    Hemostat:  Yes    Drainage:  Purulent    Drainage amount:  Copious    Wound treatment:  Packing placed    Packing materials:  1/2 in gauze  Post-procedure details:     Patient tolerance of procedure: Tolerated well, no immediate complications  Comments:      Discussed packing removal in 2 days - discussed epsom salt soaks, monitor for signs of spreading infection - return to ED if this occurs, discussed antibiotic coverage for 10 days  Discussed return precautions  Phone Contacts  ED Phone Contact    ED Course                               MDM  Number of Diagnoses or Management Options  Abscess of right buttock: established and worsening  Diagnosis management comments: Discussed with patient, initially planned to ct scan abdomen/pelvis to determine extent of abscess which had ultrasound performed 3 days ago and was felt to be too early to drain  Attempted IV contrast ct but iv infiltrated  Patient with very small amount of contrast extravasation to extremities  Ice applied  Distal neurovascular intact on exam   FROM  Discussed with patient care of contrast extravasation into soft tissue  Offered patient, since symptoms worsening plan to proceed with incision and drainage  Discussed risks/benefits with patient  She agrees with I&D  I&D performed, large amount of purulent material drainage, 1/2 inch packing placed  Discussed care of incision and drainage site with patient, discussed packing removal in 2 days, discussed continue/finish antibiotics,  Add hibiclenz soap to area and recommend epsom salt soaks after packing removal   Discussed follow up with PCP and general surgery for recheck of symptoms in 3-5 days  Reviewed reasons to return to ed  Patient verbalized understanding of diagnosis and agreement with discharge plan of care as well as understanding of reasons to return to ed  Standard narcotic precautions given           Amount and/or Complexity of Data Reviewed  Clinical lab tests: ordered and reviewed  Discussion of test results with the performing providers: yes  Obtain history from someone other than the patient: yes (Significant other at bedside  )  Review and summarize past medical records: yes  Independent visualization of images, tracings, or specimens: yes    Patient Progress  Patient progress: stable    CritCare Time    Disposition  Final diagnoses:   Abscess of right buttock     Time reflects when diagnosis was documented in both MDM as applicable and the Disposition within this note     Time User Action Codes Description Comment    8/7/2018  3:07 PM Pelon Mitchell Add [L02 31] Abscess of right buttock       ED Disposition     ED Disposition Condition Comment    Discharge  Sydniexavier Akira discharge to home/self care  Condition at discharge: Stable        Follow-up Information     Follow up With Specialties Details Why Contact Info Additional Information    Ching Kingston DO Family Medicine Call in 1 day for further evaluation of symptoms [address] [facility]  [address]       Yara Faulkner MD General Surgery Call in 3 days for further evaluation of symptoms [address]  [place] [phone] [facility] Emergency Department Emergency Medicine Go to If symptoms worsen [address] [facility]  [phone] [place] ED, [address] [place], [place], [address]          Discharge Medication List as of 8/7/2018  3:11 PM      START taking these medications    Details   chlorhexidine (HIBICLENS) 4 % external liquid Apply 1 application topically daily as needed for wound care, Starting Tue 8/7/2018, Print      oxyCODONE-acetaminophen (PERCOCET) 5-325 mg per tablet Take 1 tablet by mouth every 6 (six) hours as needed for severe pain (abscess/initial rx ) for up to 3 days Label no driving no etoh  Initial rx  Dx: Max Daily Amount: 4 tablets, Starting Tue 8/7/2018, Until Fri 8/10/2018, Print      !! sulfamethoxazole-trimethoprim (BACTRIM DS) 800-160 mg per tablet Take 1 tablet by mouth 2 (two) times a day for 5 days, Starting Tue 8/7/2018, Until Sun 8/12/2018, Print      cephalexin (KEFLEX) 500 mg capsule Take 1 capsule (500 mg total) by mouth 3 (three) times a day for 5 days, Starting Tue 8/7/2018, Until Sun 8/12/2018, Print       !! - Potential duplicate medications found  Please discuss with provider        CONTINUE these medications which have NOT CHANGED    Details   azithromycin (ZITHROMAX) 1 g powder TAKE 1 PACKET (1 G TOTAL) BY MOUTH ONCE FOR 1 DOSE , Historical Med      !! cephalexin (KEFLEX) 250 mg/5 mL suspension TAKE 10 ML (500 MG TOTAL) BY MOUTH EVERY 8 (EIGHT) HOURS FOR 7 DAYS  DISCARD ANY REMAINDER, Historical Med      !! cephalexin (KEFLEX) 250 mg/5 mL suspension Take 10 mL (500 mg total) by mouth every 6 (six) hours for 7 days, Starting Sun 8/5/2018, Until Sun 8/12/2018, Normal      fluticasone (CUTIVATE) 0 05 % cream Apply topically 2 (two) times a day, Starting Thu 2/15/2018, Normal      ondansetron (ZOFRAN) 4 mg tablet TAKE 1 TABLET (4 MG TOTAL) BY MOUTH ONCE FOR 1 DOSE , Historical Med      !! sulfamethoxazole-trimethoprim (BACTRIM DS) 800-160 mg per tablet Take 1 tablet by mouth 2 (two) times a day for 7 days smx-tmp DS (BACTRIM) 800-160 mg tabs (1tab q12 D10), Starting Sun 8/5/2018, Until Sun 8/12/2018, Normal       !! - Potential duplicate medications found  Please discuss with provider  No discharge procedures on file      ED Provider  Electronically Signed by           Calli Reilly PA-C  08/08/18 5516

## 2018-08-07 NOTE — DISCHARGE INSTRUCTIONS
Abscess   WHAT YOU NEED TO KNOW:   A warm compress may help your abscess drain  Your healthcare provider may make a cut in the abscess so it can drain  You may need surgery to remove an abscess that is on your hands or buttocks  DISCHARGE INSTRUCTIONS:   Return to the emergency department if:   · The area around your abscess becomes very painful, warm, or has red streaks  · You have a fever and chills  · Your heart is beating faster than usual      · You feel faint or confused  Contact your healthcare provider if:   · Your abscess gets bigger or does not get better  · Your abscess returns  · You have questions or concerns about your condition or care  Medicines: You may  need any of the following:  · Antibiotics  help treat a bacterial infection  · Acetaminophen  decreases pain and fever  It is available without a doctor's order  Ask how much to take and how often to take it  Follow directions  Acetaminophen can cause liver damage if not taken correctly  · NSAIDs , such as ibuprofen, help decrease swelling, pain, and fever  This medicine is available with or without a doctor's order  NSAIDs can cause stomach bleeding or kidney problems in certain people  If you take blood thinner medicine, always ask your healthcare provider if NSAIDs are safe for you  Always read the medicine label and follow directions  · Take your medicine as directed  Contact your healthcare provider if you think your medicine is not helping or if you have side effects  Tell him or her if you are allergic to any medicine  Keep a list of the medicines, vitamins, and herbs you take  Include the amounts, and when and why you take them  Bring the list or the pill bottles to follow-up visits  Carry your medicine list with you in case of an emergency  Self-care:   · Apply a warm compress to your abscess  This will help it open and drain  Wet a washcloth in warm, but not hot, water  Apply the compress for 10 minutes  Repeat this 4 times each day  Do not  press on an abscess or try to open it with a needle  You may push the bacteria deeper or into your blood  · Do not share your clothes, towels, or sheets with anyone  This can spread the infection to others  · Wash your hands often  This can help prevent the spread of germs  Use soap and water or an alcohol-based hand rub  Care for your wound after it is drained:   · Care for your wound as directed  If your healthcare provider says it is okay, carefully remove the bandage and gauze packing  You may need to soak the gauze to get it out of your wound  Clean your wound and the area around it as directed  Dry the area and put on new, clean bandages  Change your bandages when they get wet or dirty  · Ask your healthcare provider how to change the gauze in your wound  Keep track of how many pieces of gauze are placed inside the wound  Do not put too much packing in the wound  Do not pack the gauze too tightly in your wound  Follow up with your healthcare provider in 1 to 3 days: You may need to have your packing removed or your bandage changed  Write down your questions so you remember to ask them during your visits  © 2017 [facility] Information is for End User's use only and may not be sold, redistributed or otherwise used for commercial purposes  All illustrations and images included in CareNotes® are the copyrighted property of [employer]  or Mahad Andrews  The above information is an  only  It is not intended as medical advice for individual conditions or treatments  Talk to your doctor, nurse or pharmacist before following any medical regimen to see if it is safe and effective for you  Abscess Incision and Drainage   AMBULATORY CARE:   What you need to know about an abscess incision and drainage:   An abscess incision and drainage (I and D) is a procedure to drain pus from an abscess and clean it out so it can heal   How to prepare for an I and D:  Your healthcare provider will talk to you about how to prepare for an I and D  He will tell you what medicines to take or not take on the day of your I and D  What will happen during an I and D:  You will be given local or regional anesthesia to numb the area and keep you free from pain  Your healthcare provider will make an incision in your skin near or over the abscess  He will press on the area to drain the pus  A cotton swab or other medical tool wrapped in gauze may be used to clean the inside of the abscess  Your healthcare provider may wash the wound with saline (salt water)  He may place plain gauze or gauze with medicine in your wound to help it heal  A dry bandage will be placed over your wound  You may be given antibiotics to treat the infection  What are the risks of an I and D:  A scar may form on your skin as it heals  Your incision may heal slowly or get infected  Your abscess may come back, even after treatment  You may need another I and D if the abscess comes back  The bacteria may spread to your heart or other organs  This can be life-threatening  Contact your healthcare provider if:   · The area around your abscess has red streaks or is warm and painful  · You have a fever or chills  · You have increased redness, swelling, or pain in your wound  · Your wound does not start to heal after a few days  · Your abscess returns  · You have questions or concerns about your condition or care  NSAIDs , such as ibuprofen, help decrease swelling, pain, and fever  NSAIDs can cause stomach bleeding or kidney problems in certain people  If you take blood thinner medicine, always ask your healthcare provider if NSAIDs are safe for you  Always read the medicine label and follow directions  Care for your wound as directed:   · Do not remove your bandage  unless your healthcare provider says it is okay  Keep the bandage clean and dry   Remove your bandage and clean the wound once your healthcare provider gives you directions  · Apply heat  on the bandage over your wound for 20 to 30 minutes every 2 hours for as many days as directed  This will increase blood flow to the area and help it heal     · Elevate  your wound above level of your heart as often as you can  This will help decrease swelling and pain  Prop your wounded area on pillows or blankets to keep it elevated comfortably  Follow up with your healthcare provider as directed: You may need to return in 1 to 3 days to have the gauze in your wound removed and your wound examined  You may be taught how to change the gauze in your wound  Write down your questions so you remember to ask them during your visits  © 2017 [facility] [facility] Information is for End User's use only and may not be sold, redistributed or otherwise used for commercial purposes  All illustrations and images included in CareNotes® are the copyrighted property of [employer]  or Mahad Andrews  The above information is an  only  It is not intended as medical advice for individual conditions or treatments  Talk to your doctor, nurse or pharmacist before following any medical regimen to see if it is safe and effective for you

## 2018-08-08 ENCOUNTER — OFFICE VISIT (OUTPATIENT)
Dept: FAMILY MEDICINE CLINIC | Facility: CLINIC | Age: 23
End: 2018-08-08
Payer: COMMERCIAL

## 2018-08-08 VITALS
TEMPERATURE: 97.5 F | OXYGEN SATURATION: 98 % | HEIGHT: 62 IN | WEIGHT: 175 LBS | BODY MASS INDEX: 32.2 KG/M2 | SYSTOLIC BLOOD PRESSURE: 110 MMHG | HEART RATE: 63 BPM | DIASTOLIC BLOOD PRESSURE: 68 MMHG

## 2018-08-08 DIAGNOSIS — L03.317 CELLULITIS OF BUTTOCK: Primary | ICD-10-CM

## 2018-08-08 PROCEDURE — 99213 OFFICE O/P EST LOW 20 MIN: CPT | Performed by: FAMILY MEDICINE

## 2018-08-08 NOTE — PROGRESS NOTES
Assessment/Plan:         Diagnoses and all orders for this visit:    Cellulitis of buttock         discussed plan of care with patient reviewed results of culture to continue current antibiotics instructed on dressing and changing, encouraged to keep area dry, to follow up with surgeon tomorrow  Subjective:      Patient ID: Federico Eller is a 25 y o  female  Here to follow up from ER next   has been to emergency room 2 days last being Sunday 3 days ago where abscess was drained, packing placed packing has since fallen out,   discomfort is much better since the initiation, dressing has been with some blood , pain much more tolerable, negative fever chills  Medications taking as directed cephalexin and Bactrim   has follow-up appointment with surgeon tomorrow          The following portions of the patient's history were reviewed and updated as appropriate:   She has a past medical history of Adult celiac disease  ,   does not have a problem list on file  ,   has a past surgical history that includes Hernia repair  ,  family history includes Asthma in her sister; Cancer in her family, paternal grandfather, and paternal grandmother; Diabetes in her family, maternal grandfather, and paternal grandmother; Hyperlipidemia in her family; Hypertension in her family, maternal grandfather, and paternal grandmother; Thyroid disease in her family, maternal grandmother, and mother  ,   reports that she quit smoking about 2 months ago  Her smoking use included Cigarettes  She smoked 0 50 packs per day  She has never used smokeless tobacco  She reports that she does not drink alcohol or use drugs  ,  has No Known Allergies         Review of Systems   Skin: Positive for wound  All other systems reviewed and are negative  Objective:  Vitals:    08/08/18 1506   BP: 110/68   Pulse: 63   Temp: 97 5 °F (36 4 °C)   SpO2: 98%      Physical Exam   Constitutional: She appears well-developed and well-nourished  No distress  Cardiovascular: Normal rate      Pulmonary/Chest: Effort normal    Skin:   Left lower buttocks  Wound draining, erythematous area small  than marked lines  Wound expressed moderate amount copious material, tolerated   Dressing replaced no packing placed instructed on wound care

## 2018-08-09 ENCOUNTER — OFFICE VISIT (OUTPATIENT)
Dept: SURGERY | Facility: CLINIC | Age: 23
End: 2018-08-09
Payer: COMMERCIAL

## 2018-08-09 VITALS
DIASTOLIC BLOOD PRESSURE: 78 MMHG | WEIGHT: 175.6 LBS | HEART RATE: 75 BPM | TEMPERATURE: 99.3 F | HEIGHT: 62 IN | RESPIRATION RATE: 18 BRPM | BODY MASS INDEX: 32.31 KG/M2 | SYSTOLIC BLOOD PRESSURE: 120 MMHG

## 2018-08-09 DIAGNOSIS — L02.31 ABSCESS OF RIGHT BUTTOCK: Primary | ICD-10-CM

## 2018-08-09 LAB
BACTERIA WND AEROBE CULT: ABNORMAL
GRAM STN SPEC: ABNORMAL
GRAM STN SPEC: ABNORMAL

## 2018-08-09 PROCEDURE — 99243 OFF/OP CNSLTJ NEW/EST LOW 30: CPT | Performed by: SURGERY

## 2018-08-09 NOTE — PROGRESS NOTES
Consult- General Surgery   Margreta Homans 25 y o  female MRN: [id number]  Unit/Bed#:  Encounter: [id number]    Assessment/Plan     Assessment:  Right buttock abscess, improving  Plan:  Continue with warm compresses and antibiotics continue with warm compresses and antibiotics  She will return to my office in 2 weeks for follow-up  If the symptoms recurred or worsened and then surgical intervention will be recommended at that time  History of Present Illness     HPI:  Margreta Homans is a 25 y o  female who presents to my office for evaluation of right buttock abscess  The patient noted the abscess approximately 10 days ago, subsequently she went to the emergency room 2 days ago because of severe pain, swelling, no able to sit, denied having any chills, fever or any other constitutional symptoms  The patient had the abscess drained and emergency room  Subsequently she went to see her primary care physician and referred to us for further evaluation  The patient stated that the swelling has improved significantly  The pain has improved as well  Denies having any chills or fever at this time  Cultures taken in the emergency room revealed 3+ growth of Staph Aureus    Review of Systems   Constitutional: Negative for chills and fever  HENT: Negative for nosebleeds and sore throat  Eyes: Negative for pain and discharge  Respiratory: Negative for cough and shortness of breath  Cardiovascular: Negative for chest pain and palpitations  Gastrointestinal: Negative for abdominal pain, constipation, diarrhea and nausea  Endocrine: Negative for cold intolerance and heat intolerance  Genitourinary: Negative for dysuria and hematuria  Neurological: Negative for seizures and headaches  Hematological: Negative for adenopathy  Does not bruise/bleed easily  Psychiatric/Behavioral: Negative for confusion  The patient is not nervous/anxious          Historical Information   Past Medical History: Diagnosis Date    Adult celiac disease     Last Assessed 25Jan2017     Past Surgical History:   Procedure Laterality Date    HERNIA REPAIR       Social History   History   Alcohol Use No     Comment: social     History   Drug Use No     History   Smoking Status    Former Smoker    Packs/day: 0 50    Types: Cigarettes    Quit date: 6/8/2018   Smokeless Tobacco    Never Used     Family History: non-contributory    Meds/Allergies   all medications and allergies reviewed     Current Outpatient Prescriptions:     cephalexin (KEFLEX) 250 mg/5 mL suspension, Take 10 mL (500 mg total) by mouth every 6 (six) hours for 7 days, Disp: 100 mL, Rfl: 0    cephalexin (KEFLEX) 250 mg/5 mL suspension, Take 10 mL (500 mg total) by mouth 3 (three) times a day for 5 days, Disp: 150 mL, Rfl: 0    chlorhexidine (HIBICLENS) 4 % external liquid, Apply 1 application topically daily as needed for wound care, Disp: 120 mL, Rfl: 0    oxyCODONE-acetaminophen (PERCOCET) 5-325 mg per tablet, Take 1 tablet by mouth every 6 (six) hours as needed for severe pain (abscess/initial rx ) for up to 3 days Label no driving no etoh  Initial rx    Dx: Max Daily Amount: 4 tablets, Disp: 12 tablet, Rfl: 0    sulfamethoxazole-trimethoprim (BACTRIM DS) 800-160 mg per tablet, Take 1 tablet by mouth 2 (two) times a day for 7 days smx-tmp DS (BACTRIM) 800-160 mg tabs (1tab q12 D10), Disp: 14 tablet, Rfl: 0    sulfamethoxazole-trimethoprim (BACTRIM DS) 800-160 mg per tablet, Take 1 tablet by mouth 2 (two) times a day for 5 days, Disp: 10 tablet, Rfl: 0  No Known Allergies    Objective     Current Vitals:   Blood Pressure: 120/78 (08/09/18 0830)  Pulse: 75 (08/09/18 0830)  Temperature: 99 3 °F (37 4 °C) (08/09/18 0830)  Temp Source: Oral (08/09/18 0830)  Respirations: 18 (08/09/18 0830)  Height: 5' 2" (157 5 cm) (08/09/18 0830)  Weight - Scale: 79 7 kg (175 lb 9 6 oz) (08/09/18 0830)      Invasive Devices          No matching active lines, drains, or airways          Physical Exam   Constitutional: She is oriented to person, place, and time  She appears well-developed and well-nourished  No distress  HENT:   Head: Normocephalic  Mouth/Throat: No oropharyngeal exudate  Eyes: Pupils are equal, round, and reactive to light  No scleral icterus  Neck: Normal range of motion  Cardiovascular: Normal rate and regular rhythm  No murmur heard  Pulmonary/Chest: Effort normal and breath sounds normal  No respiratory distress  Abdominal: Soft  She exhibits no mass  There is no tenderness  Genitourinary:   Genitourinary Comments: There is an indurated area on the right buttock measure approximately 3 cm, there is no evidence of drainage, no evidence of cellulitis, no fluctuation  Musculoskeletal: She exhibits no edema or tenderness  Lymphadenopathy:     She has no cervical adenopathy  Neurological: She is alert and oriented to person, place, and time  No cranial nerve deficit  Skin: No rash noted  No erythema  Psychiatric: She has a normal mood and affect   Her behavior is normal

## 2018-08-13 ENCOUNTER — OFFICE VISIT (OUTPATIENT)
Dept: FAMILY MEDICINE CLINIC | Facility: CLINIC | Age: 23
End: 2018-08-13
Payer: COMMERCIAL

## 2018-08-13 VITALS
HEART RATE: 78 BPM | SYSTOLIC BLOOD PRESSURE: 118 MMHG | OXYGEN SATURATION: 99 % | HEIGHT: 62 IN | TEMPERATURE: 97.7 F | WEIGHT: 175 LBS | BODY MASS INDEX: 32.2 KG/M2 | DIASTOLIC BLOOD PRESSURE: 70 MMHG

## 2018-08-13 DIAGNOSIS — R19.7 DIARRHEA, UNSPECIFIED TYPE: ICD-10-CM

## 2018-08-13 DIAGNOSIS — L02.31 ABSCESS OF RIGHT BUTTOCK: Primary | ICD-10-CM

## 2018-08-13 PROCEDURE — 1036F TOBACCO NON-USER: CPT | Performed by: FAMILY MEDICINE

## 2018-08-13 PROCEDURE — 99213 OFFICE O/P EST LOW 20 MIN: CPT | Performed by: FAMILY MEDICINE

## 2018-08-13 PROCEDURE — 3008F BODY MASS INDEX DOCD: CPT | Performed by: FAMILY MEDICINE

## 2018-08-13 NOTE — PROGRESS NOTES
Assessment/Plan:         Diagnoses and all orders for this visit:    Abscess of right buttock    Diarrhea, unspecified type         right buttock abscess, resolved  Discussed plan of care encourage to cover small wound until total resolution, may hold antibiotics no need for further treatment at this point discussed potential causative factors  Diarrhea most likely secondary to antibiotic therapy, recommended align probiotic, so, clear fluids stressed importance of hydration follow-up as needed      Subjective:      Patient ID: Evin Duenas is a 25 y o  female  Here to follow up on wound, to right buttocks  Has been seen by certain they did nothing has been taking antibiotics as directed Keflex and Bactrim   completed Zithromax next   was not total results of wound culture while at surgeon's office  Has been self treating with warm moist soaks area, not covering area with Band-Aid, no longer tender not draining, not been able to   See area location   has been having some nausea and diarrhea x2 days with headache able to tolerate fluids, negative vomiting, negative fever chills   mother  Room   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      Headache    Associated symptoms include vomiting  Pertinent negatives include no abdominal pain, back pain, coughing, dizziness, ear pain, fever, hearing loss, nausea, numbness, rhinorrhea, sinus pressure, sore throat, tinnitus or weakness  Vomiting    Associated symptoms include diarrhea and headaches  Pertinent negatives include no abdominal pain, arthralgias, chest pain, chills, coughing, dizziness, fever or myalgias  Diarrhea    Associated symptoms include headaches and vomiting  Pertinent negatives include no abdominal pain, arthralgias, chills, coughing, fever or myalgias  The following portions of the patient's history were reviewed and updated as appropriate:   She has a past medical history of Adult celiac disease  ,   does not have a problem list on file ,   has a past surgical history that includes Hernia repair  ,  family history includes Asthma in her sister; Cancer in her family, paternal grandfather, and paternal grandmother; Diabetes in her family, maternal grandfather, and paternal grandmother; Hyperlipidemia in her family; Hypertension in her family, maternal grandfather, and paternal grandmother; Thyroid disease in her family, maternal grandmother, and mother  ,   reports that she quit smoking about 2 months ago  Her smoking use included Cigarettes  She smoked 0 50 packs per day  She has never used smokeless tobacco  She reports that she does not drink alcohol or use drugs  ,  has No Known Allergies         Review of Systems   Constitutional: Negative for appetite change, chills, fever and unexpected weight change  HENT: Negative for congestion, dental problem, ear pain, hearing loss, postnasal drip, rhinorrhea, sinus pain, sinus pressure, sneezing, sore throat, tinnitus and voice change  Eyes: Negative for visual disturbance  Respiratory: Negative for apnea, cough, chest tightness and shortness of breath  Cardiovascular: Negative for chest pain, palpitations and leg swelling  Gastrointestinal: Positive for diarrhea and vomiting  Negative for abdominal pain, blood in stool, constipation and nausea  Endocrine: Negative for cold intolerance, heat intolerance, polydipsia, polyphagia and polyuria  Genitourinary: Negative for decreased urine volume, difficulty urinating, dysuria, frequency and hematuria  Musculoskeletal: Negative for arthralgias, back pain, gait problem, joint swelling and myalgias  Skin: Negative for color change, rash and wound  Allergic/Immunologic: Negative for environmental allergies and food allergies  Neurological: Positive for headaches  Negative for dizziness, syncope, weakness, light-headedness and numbness  Hematological: Negative for adenopathy  Does not bruise/bleed easily     Psychiatric/Behavioral: Negative for sleep disturbance and suicidal ideas  The patient is not nervous/anxious  Objective:  Vitals:    08/13/18 0940   BP: 118/70   Pulse: 78   Temp: 97 7 °F (36 5 °C)   SpO2: 99%      Physical Exam   Constitutional: She is oriented to person, place, and time  She appears well-developed and well-nourished  HENT:   Head: Normocephalic and atraumatic  Eyes: EOM are normal    Neck: Normal range of motion  Neck supple  Cardiovascular: Normal rate, regular rhythm and normal heart sounds  Pulmonary/Chest: Effort normal and breath sounds normal    Abdominal: Soft  Bowel sounds are normal    Musculoskeletal: Normal range of motion  Neurological: She is alert and oriented to person, place, and time  She has normal reflexes  Skin: Skin is warm and dry  Wound  right buttocks   small incision only 4 mm negative and irradiated negative erythema nondraining, non tender   Psychiatric: She has a normal mood and affect   Her behavior is normal  Judgment and thought content normal

## 2019-02-05 LAB — EXTERNAL HIV SCREEN: NORMAL

## 2019-11-14 NOTE — ED PROVIDER NOTES
[facility] arrived to transport patient to [facility].  Report given.    History  Chief Complaint   Patient presents with    Abscess     c/o abcess on rt buttocks   went to urgent care and was given oral steroids  Khadijah Shultz which is currently taking     HPI  71-year-old female presents the emergency department for evaluation of right buttock erythema and swelling  Patient states that she noted a small erythematous bump on her right glute a few days ago, but that erythema spread out and became more tender today  She has had multiple similar small bumps on both of her legs and was seen at urgent care a few days ago after one of the bumps on her leg swelled similarly to the one on her glute today  She states that she was given steroids, but lesions have not gone away  She denies any recent fevers, chills, chest pain, shortness of breath, abdominal pain, nausea, vomiting, or complaints other than stated above  Khadijah Shultz Ultrasound reveals developing abscess with small pockets of fluid, not enough to drain  Prior to Admission Medications   Prescriptions Last Dose Informant Patient Reported? Taking? azithromycin (ZITHROMAX) 1 g powder   Yes No   Sig: TAKE 1 PACKET (1 G TOTAL) BY MOUTH ONCE FOR 1 DOSE  cephalexin (KEFLEX) 250 mg/5 mL suspension   Yes No   Sig: TAKE 10 ML (500 MG TOTAL) BY MOUTH EVERY 8 (EIGHT) HOURS FOR 7 DAYS  DISCARD ANY REMAINDER   fluticasone (CUTIVATE) 0 05 % cream   No No   Sig: Apply topically 2 (two) times a day   ondansetron (ZOFRAN) 4 mg tablet   Yes No   Sig: TAKE 1 TABLET (4 MG TOTAL) BY MOUTH ONCE FOR 1 DOSE  Facility-Administered Medications: None       History reviewed  No pertinent past medical history  History reviewed  No pertinent surgical history      Family History   Problem Relation Age of Onset    Diabetes Maternal Grandfather     Hypertension Maternal Grandfather     Diabetes Paternal Grandmother     Hypertension Paternal Grandmother     Cancer Paternal Grandmother     Cancer Paternal Grandfather      I have reviewed and agree with the history as documented  Social History   Substance Use Topics    Smoking status: Current Some Day Smoker     Packs/day: 0 50     Types: Cigarettes    Smokeless tobacco: Never Used    Alcohol use No      Comment: social        Review of Systems   Constitutional: Negative for chills and fever  Respiratory: Negative for shortness of breath  Gastrointestinal: Negative for abdominal pain, nausea and vomiting  Musculoskeletal: Negative for arthralgias and joint swelling  Skin: Positive for rash  Negative for wound  Allergic/Immunologic: Negative for immunocompromised state  Neurological: Negative for headaches  Psychiatric/Behavioral: The patient is not nervous/anxious  All other systems reviewed and are negative  Physical Exam  Physical Exam   Constitutional: She is oriented to person, place, and time  She appears well-nourished  No distress  HENT:   Head: Normocephalic and atraumatic  Eyes: EOM are normal    Neck: Normal range of motion  Neck supple  Cardiovascular: Normal rate and regular rhythm  Pulmonary/Chest: Effort normal and breath sounds normal  No respiratory distress  Abdominal: Soft  She exhibits no distension  There is no tenderness  Musculoskeletal: Normal range of motion  Neurological: She is alert and oriented to person, place, and time  Skin: Skin is warm and dry  Rash noted  She is not diaphoretic  Small pustules on legs, scabbed over  Right glute with cellulitis 6 cm x 4 cm with central induration, no drainage   Psychiatric: She has a normal mood and affect  Her behavior is normal    Nursing note and vitals reviewed        Vital Signs  ED Triage Vitals [08/05/18 2058]   Temperature Pulse Respirations Blood Pressure SpO2   98 4 °F (36 9 °C) 93 18 143/72 98 %      Temp Source Heart Rate Source Patient Position - Orthostatic VS BP Location FiO2 (%)   Oral Monitor Sitting Right arm --      Pain Score       8           Vitals:    08/05/18 2058   BP: 143/72   Pulse: 93 Patient Position - Orthostatic VS: Sitting       Visual Acuity      ED Medications  Medications   sulfamethoxazole-trimethoprim (BACTRIM DS) 800-160 mg per tablet 1 tablet (1 tablet Oral Given 8/5/18 2159)   cephalexin (KEFLEX) oral suspension 500 mg (500 mg Oral Given 8/5/18 2200)       Diagnostic Studies  Results Reviewed     None                 No orders to display              Procedures  Procedures       Phone Contacts  ED Phone Contact    ED Course                               MDM  Number of Diagnoses or Management Options  Cellulitis and abscess of buttock:   Diagnosis management comments: 80-year-old female with cellulitis R buttock, developing abscess seen on ultrasound  No drainable collection at this time  Will discharge home with Keflex, Bactrim, and follow up/return precautions  Patient aware that this may need drainage if it does not improve with antibiotics  CritCare Time    Disposition  Final diagnoses:   Cellulitis and abscess of buttock     Time reflects when diagnosis was documented in both MDM as applicable and the Disposition within this note     Time User Action Codes Description Comment    8/5/2018  9:53 PM Jovita Kirkpatrick Add [L03 317] Cellulitis of buttock, right     8/5/2018  9:56 PM Delphine Acuña Add [L02 31,  L03 317] Cellulitis and abscess of buttock     8/5/2018  9:56 PM Delphine Acuña Modify [L02 31,  L02 884] Cellulitis and abscess of buttock     8/5/2018  9:56 PM Delphine Acuña Remove [W07 [id number]] Cellulitis of buttock, right       ED Disposition     ED Disposition Condition Comment    Discharge  Joselin eWlls discharge to home/self care      Condition at discharge: Good        Follow-up Information     Follow up With Specialties Details Why Contact Info Additional Information    Samir Kyle, DO Family Medicine In 3 days As needed [address] Emergency Department Emergency Medicine  If symptoms worsen [address] [facility] [place] [address]  [phone] [place] [facility], [address] [place], [place], [address]          Discharge Medication List as of 8/5/2018 10:14 PM      START taking these medications    Details   !! cephalexin (KEFLEX) 250 mg/5 mL suspension Take 10 mL (500 mg total) by mouth every 6 (six) hours for 7 days, Starting Sun 8/5/2018, Until Sun 8/12/2018, Normal      sulfamethoxazole-trimethoprim (BACTRIM DS) 800-160 mg per tablet Take 1 tablet by mouth 2 (two) times a day for 7 days smx-tmp DS (BACTRIM) 800-160 mg tabs (1tab q12 D10), Starting Sun 8/5/2018, Until Sun 8/12/2018, Normal       !! - Potential duplicate medications found  Please discuss with provider  CONTINUE these medications which have NOT CHANGED    Details   azithromycin (ZITHROMAX) 1 g powder TAKE 1 PACKET (1 G TOTAL) BY MOUTH ONCE FOR 1 DOSE , Historical Med      !! cephalexin (KEFLEX) 250 mg/5 mL suspension TAKE 10 ML (500 MG TOTAL) BY MOUTH EVERY 8 (EIGHT) HOURS FOR 7 DAYS  DISCARD ANY REMAINDER, Historical Med      fluticasone (CUTIVATE) 0 05 % cream Apply topically 2 (two) times a day, Starting Thu 2/15/2018, Normal      ondansetron (ZOFRAN) 4 mg tablet TAKE 1 TABLET (4 MG TOTAL) BY MOUTH ONCE FOR 1 DOSE , Historical Med       !! - Potential duplicate medications found  Please discuss with provider  No discharge procedures on file      ED Provider  Electronically Signed by           Wilma Bravo MD  08/07/18 1509

## 2019-12-29 ENCOUNTER — HOSPITAL ENCOUNTER (EMERGENCY)
Facility: HOSPITAL | Age: 24
Discharge: HOME | End: 2019-12-29
Attending: EMERGENCY MEDICINE
Payer: COMMERCIAL

## 2019-12-29 VITALS
DIASTOLIC BLOOD PRESSURE: 78 MMHG | RESPIRATION RATE: 18 BRPM | SYSTOLIC BLOOD PRESSURE: 122 MMHG | HEART RATE: 73 BPM | TEMPERATURE: 98.6 F | OXYGEN SATURATION: 98 %

## 2019-12-29 DIAGNOSIS — S01.01XA LACERATION OF SCALP, INITIAL ENCOUNTER: Primary | ICD-10-CM

## 2019-12-29 PROCEDURE — 12002 RPR S/N/AX/GEN/TRNK2.6-7.5CM: CPT | Performed by: NURSE PRACTITIONER

## 2019-12-29 PROCEDURE — 99284 EMERGENCY DEPT VISIT MOD MDM: CPT | Performed by: NURSE PRACTITIONER

## 2019-12-29 PROCEDURE — 99283 EMERGENCY DEPT VISIT LOW MDM: CPT

## 2019-12-29 RX ORDER — ACETAMINOPHEN 325 MG/1
975 TABLET ORAL ONCE
Status: COMPLETED | OUTPATIENT
Start: 2019-12-29 | End: 2019-12-29

## 2019-12-29 RX ORDER — GINSENG 100 MG
1 CAPSULE ORAL ONCE
Status: COMPLETED | OUTPATIENT
Start: 2019-12-29 | End: 2019-12-29

## 2019-12-29 RX ADMIN — BACITRACIN ZINC 1 LARGE APPLICATION: 500 OINTMENT TOPICAL at 14:42

## 2019-12-29 RX ADMIN — ACETAMINOPHEN 975 MG: 325 TABLET, FILM COATED ORAL at 14:41

## 2019-12-29 NOTE — ED PROVIDER NOTES
History  Chief Complaint   Patient presents with    Head Injury     Patient reports fall off a stool, lac to back of the head  Bleeding controlled  Denies loc, denies thinners  A 51-year-old female presents here with a chief complaint of laceration to her scalp  She fell off a stool in his corner of fracture causing a 3 centimeter laceration  She denies any loss of consciousness and is no longer bleeding  Her tetanus shot is up-to-date  Prior to Admission Medications   Prescriptions Last Dose Informant Patient Reported? Taking?   chlorhexidine (HIBICLENS) 4 % external liquid   No No   Sig: Apply 1 application topically daily as needed for wound care      Facility-Administered Medications: None       Past Medical History:   Diagnosis Date    Adult celiac disease     Last Assessed 2017       Past Surgical History:   Procedure Laterality Date    HERNIA REPAIR         Family History   Problem Relation Age of Onset    Diabetes Maternal Grandfather     Hypertension Maternal Grandfather     Diabetes Paternal [de-identified]     Hypertension Paternal Grandmother     Cancer Paternal Grandmother     Cancer Paternal Grandfather     Thyroid disease Mother     Asthma Sister     Thyroid disease Maternal Grandmother     Cancer Family     Hypertension Family     Thyroid disease Family     Hyperlipidemia Family     Diabetes Family         Type 2 DM with other ophthalmic complication     I have reviewed and agree with the history as documented  Social History     Tobacco Use    Smoking status: Former Smoker     Packs/day: 0 50     Types: Cigarettes     Last attempt to quit: 2018     Years since quittin 5    Smokeless tobacco: Never Used   Substance Use Topics    Alcohol use: No     Comment: social    Drug use: No        Review of Systems   Constitutional: Negative for activity change, fatigue and fever  HENT: Negative for congestion, ear pain, rhinorrhea and sore throat      Eyes: Negative  Respiratory: Negative for cough, shortness of breath and wheezing  Gastrointestinal: Negative for abdominal pain, diarrhea, nausea and vomiting  Endocrine: Negative  Genitourinary: Negative for difficulty urinating, dyspareunia, dysuria, flank pain, frequency, menstrual problem, pelvic pain, urgency, vaginal bleeding, vaginal discharge and vaginal pain  Musculoskeletal: Negative for arthralgias and myalgias  Skin: Positive for wound  Negative for color change and pallor  Neurological: Negative for dizziness, speech difficulty, weakness and headaches  Hematological: Negative for adenopathy  Psychiatric/Behavioral: Negative for confusion  Physical Exam  Physical Exam   Constitutional: She is oriented to person, place, and time  She appears well-developed and well-nourished  She is cooperative  Non-toxic appearance  She does not have a sickly appearance  She does not appear ill  No distress  HENT:   Head: Normocephalic and atraumatic  Right Ear: Tympanic membrane and external ear normal    Left Ear: Tympanic membrane and external ear normal    Nose: No rhinorrhea, sinus tenderness or nasal deformity  No epistaxis  Right sinus exhibits no maxillary sinus tenderness and no frontal sinus tenderness  Left sinus exhibits no maxillary sinus tenderness and no frontal sinus tenderness  Mouth/Throat: Oropharynx is clear and moist and mucous membranes are normal  Normal dentition  Eyes: Pupils are equal, round, and reactive to light  EOM are normal    Neck: Normal range of motion  Neck supple  Cardiovascular: Normal rate, regular rhythm and normal heart sounds  No murmur heard  Pulmonary/Chest: Effort normal and breath sounds normal  No accessory muscle usage  No respiratory distress  She has no wheezes  She has no rales  She exhibits no tenderness  Abdominal: Soft  She exhibits no distension  There is no guarding  Musculoskeletal: Normal range of motion   She exhibits no edema or tenderness  Lymphadenopathy:     She has no cervical adenopathy  Neurological: She is alert and oriented to person, place, and time  She exhibits normal muscle tone  Skin: Skin is warm and dry  No rash noted  No erythema  Psychiatric: She has a normal mood and affect  Nursing note and vitals reviewed  Vital Signs  ED Triage Vitals [12/29/19 1411]   Temperature Pulse Respirations Blood Pressure SpO2   98 6 °F (37 °C) 73 18 122/78 98 %      Temp Source Heart Rate Source Patient Position - Orthostatic VS BP Location FiO2 (%)   Oral Monitor Sitting Left arm --      Pain Score       6           Vitals:    12/29/19 1411   BP: 122/78   Pulse: 73   Patient Position - Orthostatic VS: Sitting         Visual Acuity      ED Medications  Medications   bacitracin topical ointment 1 large application (1 large application Topical Given 12/29/19 1442)   acetaminophen (TYLENOL) tablet 975 mg (975 mg Oral Given 12/29/19 1441)       Diagnostic Studies  Results Reviewed     None                 No orders to display              Procedures  Laceration repair  Date/Time: 12/29/2019 3:05 PM  Performed by: YANCI Berry  Authorized by: YANCI Berry   Consent: The procedure was performed in an emergent situation  Verbal consent obtained  Consent given by: patient  Patient identity confirmed: verbally with patient and arm band  Body area: head/neck  Location details: scalp  Laceration length: 3 cm      Procedure Details:  Skin closure: staples  Number of sutures: 3  Dressing: gauze roll               ED Course                               MDM  Number of Diagnoses or Management Options  Laceration of scalp, initial encounter: new and requires workup  Diagnosis management comments: Tolerated laceration repair well      Patient Progress  Patient progress: stable        Disposition  Final diagnoses:   Laceration of scalp, initial encounter     Time reflects when diagnosis was documented in both MDM as applicable and the Disposition within this note     Time User Action Codes Description Comment    12/29/2019  3:07 PM Toshia Rosario Add [S01 01XA] Laceration of scalp, initial encounter       ED Disposition     ED Disposition Condition Date/Time Comment    Discharge Stable Sun Dec 29, 2019  3:07 PM Caden Modi discharge to home/self care  Follow-up Information     Follow up With Specialties Details Why Contact Info    Fernando Basurto, [address]no, Nurse Practitioner In 5 days For suture removal [address] [place] [address]  [phone]            Discharge Medication List as of 12/29/2019  3:08 PM      CONTINUE these medications which have NOT CHANGED    Details   chlorhexidine (HIBICLENS) 4 % external liquid Apply 1 application topically daily as needed for wound care, Starting Tue 8/7/2018, Print           No discharge procedures on file      ED Provider  Electronically Signed by           YANCI Tucker  12/29/19 1652

## 2020-02-10 ENCOUNTER — OFFICE VISIT (OUTPATIENT)
Dept: FAMILY MEDICINE CLINIC | Facility: CLINIC | Age: 25
End: 2020-02-10
Payer: COMMERCIAL

## 2020-02-10 VITALS
HEIGHT: 62 IN | TEMPERATURE: 98.4 F | HEART RATE: 75 BPM | WEIGHT: 184 LBS | OXYGEN SATURATION: 98 % | DIASTOLIC BLOOD PRESSURE: 70 MMHG | BODY MASS INDEX: 33.86 KG/M2 | SYSTOLIC BLOOD PRESSURE: 104 MMHG | RESPIRATION RATE: 16 BRPM

## 2020-02-10 DIAGNOSIS — H91.93 HEARING DECREASED, BILATERAL: ICD-10-CM

## 2020-02-10 DIAGNOSIS — Z00.00 ANNUAL PHYSICAL EXAM: Primary | ICD-10-CM

## 2020-02-10 PROCEDURE — 99395 PREV VISIT EST AGE 18-39: CPT | Performed by: NURSE PRACTITIONER

## 2020-02-10 PROCEDURE — 3008F BODY MASS INDEX DOCD: CPT | Performed by: NURSE PRACTITIONER

## 2020-02-10 NOTE — PROGRESS NOTES
[facility] [address] [facility]    NAME: Tania Crew  AGE: 25 y o  SEX: female  : 1995     DATE: 2/10/2020     Assessment and Plan:     Problem List Items Addressed This Visit        Other    Annual physical exam - Primary     Declines influenza immunization  Advised to make an appointment for repeat Pap test   Follow-up in 1 year or sooner if needed  Hearing decreased, bilateral     Advised to make an appointment with the audiologist          Relevant Orders    Ambulatory Referral to Otolaryngology          Immunizations and preventive care screenings were discussed with patient today  Appropriate education was printed on patient's after visit summary  Counseling:  Alcohol/drug use: discussed moderation in alcohol intake, the recommendations for healthy alcohol use, and avoidance of illicit drug use  Dental Health: discussed importance of regular tooth brushing, flossing, and dental visits  Sexual health: discussed sexually transmitted diseases, partner selection, use of condoms, avoidance of unintended pregnancy, and contraceptive alternatives  · Exercise: the importance of regular exercise/physical activity was discussed  Recommend exercise 3-5 times per week for at least 30 minutes  BMI Counseling: Body mass index is 33 65 kg/m²  The BMI is above normal  Nutrition recommendations include decreasing portion sizes, encouraging healthy choices of fruits and vegetables, decreasing fast food intake, consuming healthier snacks, limiting drinks that contain sugar, moderation in carbohydrate intake and increasing intake of lean protein  Exercise recommendations include moderate physical activity 150 minutes/week and strength training exercises  No pharmacotherapy was ordered             Return if symptoms worsen or fail to improve, for Annual physical      Chief Complaint:     Chief Complaint   Patient presents with    Annual Exam      History of Present Illness:     Adult Annual Physical   Patient here for a comprehensive physical exam  The patient reports no problems  Diet and Physical Activity  · Diet/Nutrition: well balanced diet  · Exercise: moderate cardiovascular exercise  Depression Screening  PHQ-9 Depression Screening    PHQ-9:    Frequency of the following problems over the past two weeks:       Little interest or pleasure in doing things:  0 - not at all  Feeling down, depressed, or hopeless:  0 - not at all  PHQ-2 Score:  0       General Health  · Sleep: sleeps well  · Hearing: decreased - bilateral   · Vision: no vision problems, most recent eye exam <1 year ago and wears glasses  · Dental: regular dental visits  /GYN Health  · Last menstrual period: 1/28/20  · Contraceptive method: Abstinence  · 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, HPV  Review of Systems:     Review of Systems   Constitutional: Negative  HENT: Positive for hearing loss  Eyes: Negative  Respiratory: Negative  Cardiovascular: Negative  Gastrointestinal: Negative  Endocrine: Negative  Genitourinary: Negative  Musculoskeletal: Negative  Skin: Negative  Allergic/Immunologic: Negative  Neurological: Negative  Hematological: Negative  Psychiatric/Behavioral: Negative         Past Medical History:     Past Medical History:   Diagnosis Date    Adult celiac disease     Last Assessed 25Jan2017      Past Surgical History:     Past Surgical History:   Procedure Laterality Date    HERNIA REPAIR        Social History:     Social History     Socioeconomic History    Marital status: Single     Spouse name: None    Number of children: None    Years of education: None    Highest education level: None   Occupational History    None   Social Needs    Financial resource strain: None    Food insecurity:     Worry: None     Inability: None    Transportation needs:     Medical: None Non-medical: None   Tobacco Use    Smoking status: Former Smoker     Packs/day: 0 50     Types: Cigarettes     Last attempt to quit: 2018     Years since quittin 6    Smokeless tobacco: Never Used   Substance and Sexual Activity    Alcohol use: No     Comment: social    Drug use: No    Sexual activity: None   Lifestyle    Physical activity:     Days per week: None     Minutes per session: None    Stress: None   Relationships    Social connections:     Talks on phone: None     Gets together: None     Attends Yazidi service: None     Active member of club or organization: None     Attends meetings of clubs or organizations: None     Relationship status: None    Intimate partner violence:     Fear of current or ex partner: None     Emotionally abused: None     Physically abused: None     Forced sexual activity: None   Other Topics Concern    None   Social History Narrative    Always uses seat belt    Inadequate exercise      Family History:     Family History   Problem Relation Age of Onset    Diabetes Maternal Grandfather     Hypertension Maternal Grandfather     Diabetes Paternal Grandmother     Hypertension Paternal Grandmother     Cancer Paternal Grandmother     Cancer Paternal Grandfather     Thyroid disease Mother     Asthma Sister     Thyroid disease Maternal Grandmother     Cancer Family     Hypertension Family     Thyroid disease Family     Hyperlipidemia Family     Diabetes Family         Type 2 DM with other ophthalmic complication      Current Medications:     No current outpatient medications on file  No current facility-administered medications for this visit  Allergies:     No Known Allergies   Physical Exam:     /70   Pulse 75   Temp 98 4 °F (36 9 °C)   Resp 16   Ht 5' 2" (1 575 m)   Wt 83 5 kg (184 lb)   LMP 2020   SpO2 98%   BMI 33 65 kg/m²     Physical Exam   Constitutional: She is oriented to person, place, and time   She appears well-developed and well-nourished  No distress  HENT:   Head: Normocephalic and atraumatic  Right Ear: External ear normal    Left Ear: External ear normal    Nose: Nose normal    Mouth/Throat: Oropharynx is clear and moist    Eyes: Pupils are equal, round, and reactive to light  Conjunctivae and EOM are normal    Neck: Normal range of motion  Neck supple  No thyromegaly present  Cardiovascular: Normal rate, regular rhythm and normal heart sounds  No murmur heard  Pulmonary/Chest: Effort normal and breath sounds normal  No respiratory distress  She has no wheezes  She has no rales  She exhibits no tenderness  Abdominal: Soft  Bowel sounds are normal  She exhibits no distension and no mass  There is no tenderness  There is no rebound and no guarding  Musculoskeletal: Normal range of motion  She exhibits no edema, tenderness or deformity  Lymphadenopathy:     She has no cervical adenopathy  Neurological: She is alert and oriented to person, place, and time  No cranial nerve deficit  Skin: Skin is warm and dry  Capillary refill takes less than 2 seconds  No rash noted  No erythema  No pallor  Psychiatric: She has a normal mood and affect  Her behavior is normal  Judgment and thought content normal    Nursing note and vitals reviewed        Laura Petersen, 1959 [facility] [address] [place] [address]

## 2020-02-10 NOTE — ASSESSMENT & PLAN NOTE
Declines influenza immunization  Advised to make an appointment for repeat Pap test   Follow-up in 1 year or sooner if needed

## 2020-02-10 NOTE — PATIENT INSTRUCTIONS

## 2020-02-11 ENCOUNTER — TELEPHONE (OUTPATIENT)
Dept: ADMINISTRATIVE | Facility: OTHER | Age: 25
End: 2020-02-11

## 2020-02-11 NOTE — TELEPHONE ENCOUNTER
Upon review of the In Basket request we were able to locate, review, and update the patient chart as requested for Pap Smear (HPV) aka Cervical Cancer Screening  Any additional questions or concerns should be emailed to the Practice Liaisons via [email or web address] email, please do not reply via In Basket      Thank you  Alyne Kawasaki

## 2020-02-11 NOTE — TELEPHONE ENCOUNTER
----- Message from Mindy Monroy sent at 2/11/2020  7:18 AM EST -----  Regarding: HM ON pap  02/11/20 7:18 AM    Hello, our patient Seth Chapman has had Pap Smear (HPV) aka Cervical Cancer Screening completed/performed  Please assist in updating the patient chart by pulling the Care Everywhere (CE) document  The date of service is 01/16/2019       Thank you,  JEANNETTE Monroy PG [facility] [address] [facility]

## 2020-02-11 NOTE — TELEPHONE ENCOUNTER
----- Message from Mindy Cervantes sent at 2/11/2020  7:17 AM EST -----  Regarding: HM on HIV   02/11/20 7:17 AM    Steven, our patient Tania Crew has had HIV completed/performed  Please assist in updating the patient chart by pulling the Care Everywhere (CE) document  The date of service is 02/05/2019       Thank you,  JEANNETTE Cervantes PG [facility] [address] [facility]

## 2020-02-11 NOTE — TELEPHONE ENCOUNTER
Upon review of the In Basket request we were able to locate, review, and update the patient chart as requested for HIV  Any additional questions or concerns should be emailed to the Practice Liaisons via [email or web address] email, please do not reply via In Basket      Thank you  Ana M Madrigal

## 2024-09-07 ENCOUNTER — OFFICE VISIT (OUTPATIENT)
Dept: URGENT CARE | Facility: CLINIC | Age: 29
End: 2024-09-07
Payer: COMMERCIAL

## 2024-09-07 VITALS
OXYGEN SATURATION: 99 % | DIASTOLIC BLOOD PRESSURE: 87 MMHG | TEMPERATURE: 97.9 F | SYSTOLIC BLOOD PRESSURE: 120 MMHG | HEART RATE: 70 BPM | RESPIRATION RATE: 18 BRPM

## 2024-09-07 DIAGNOSIS — J02.9 SORE THROAT: Primary | ICD-10-CM

## 2024-09-07 LAB — S PYO AG THROAT QL: NEGATIVE

## 2024-09-07 PROCEDURE — G0382 LEV 3 HOSP TYPE B ED VISIT: HCPCS

## 2024-09-07 PROCEDURE — 87880 STREP A ASSAY W/OPTIC: CPT

## 2024-09-07 PROCEDURE — 87070 CULTURE OTHR SPECIMN AEROBIC: CPT

## 2024-09-07 RX ORDER — CETIRIZINE HYDROCHLORIDE 10 MG/1
10 TABLET ORAL DAILY
Qty: 30 TABLET | Refills: 0 | Status: SHIPPED | OUTPATIENT
Start: 2024-09-07

## 2024-09-07 NOTE — PATIENT INSTRUCTIONS
Humidified air  Salt water gargles and chloraseptic spray  Warm tea with honey  Steam showers   Over the counter Zyrtec, Claritin, or Allegra daily  Flonase daily   Ensure adequate hydration    Follow up with PCP in 3-5 days.  Proceed to the ER with worsening symptoms.

## 2024-09-07 NOTE — PROGRESS NOTES
[facility]        NAME: Ezio Marroquin is a 28 y.o. female  : 1995    MRN: [id number]  DATE: 2024  TIME: 12:49 PM    Assessment and Plan   Sore throat [J02.9]  1. Sore throat  POCT rapid ANTIGEN strepA    Throat culture    cetirizine (ZyrTEC) 10 mg tablet        Rapid strep negative in office, will send for culture.     Patient Instructions     Humidified air  Salt water gargles and chloraseptic spray  Warm tea with honey  Steam showers   Over the counter Zyrtec, Claritin, or Allegra daily  Flonase daily   Ensure adequate hydration    Follow up with PCP in 3-5 days.  Proceed to the ER with worsening symptoms.       If tests are performed, our office will contact you with results only if changes need to made to the care plan discussed with you at the visit. You can review your full results on [facility].    Chief Complaint     Chief Complaint   Patient presents with    Sore Throat     C/o sore throat that started yesterday and h/a all week. Declined covid testing/          History of Present Illness       Sore Throat   This is a new problem. The current episode started yesterday. The problem has been gradually worsening. Neither side of throat is experiencing more pain than the other. There has been no fever. Associated symptoms include headaches (x 1 week). Pertinent negatives include no abdominal pain, congestion, coughing, shortness of breath, trouble swallowing or vomiting.       Review of Systems   Review of Systems   Constitutional:  Negative for chills and fever.   HENT:  Positive for sore throat. Negative for congestion, postnasal drip, rhinorrhea, sinus pressure and trouble swallowing.    Respiratory:  Negative for cough, chest tightness and shortness of breath.    Cardiovascular:  Negative for chest pain and palpitations.   Gastrointestinal:  Negative for abdominal pain, nausea and vomiting.   Genitourinary:  Negative for difficulty urinating.   Musculoskeletal:   Negative for myalgias.   Neurological:  Positive for headaches (x 1 week). Negative for dizziness.         Current Medications       Current Outpatient Medications:     cetirizine (ZyrTEC) 10 mg tablet, Take 1 tablet (10 mg total) by mouth daily, Disp: 30 tablet, Rfl: 0    Current Allergies     Allergies as of 09/07/2024    (No Known Allergies)            The following portions of the patient's history were reviewed and updated as appropriate: allergies, current medications, past family history, past medical history, past social history, past surgical history and problem list.     Past Medical History:   Diagnosis Date    Adult celiac disease     Last Assessed 25Jan2017       Past Surgical History:   Procedure Laterality Date    HERNIA REPAIR         Family History   Problem Relation Age of Onset    Diabetes Maternal Grandfather     Hypertension Maternal Grandfather     Diabetes Paternal Grandmother     Hypertension Paternal Grandmother     Cancer Paternal Grandmother     Cancer Paternal Grandfather     Thyroid disease Mother     Asthma Sister     Thyroid disease Maternal Grandmother     Cancer Family     Hypertension Family     Thyroid disease Family     Hyperlipidemia Family     Diabetes Family         Type 2 DM with other ophthalmic complication         Medications have been verified.        Objective   /87   Pulse 70   Temp 97.9 °F (36.6 °C)   Resp 18   SpO2 99%        Physical Exam     Physical Exam  Constitutional:       General: She is not in acute distress.  HENT:      Head: Normocephalic.      Nose: Nose normal.      Mouth/Throat:      Pharynx: Posterior oropharyngeal erythema present. No oropharyngeal exudate.      Tonsils: No tonsillar exudate. 1+ on the right. 1+ on the left.   Eyes:      Pupils: Pupils are equal, round, and reactive to light.   Cardiovascular:      Rate and Rhythm: Normal rate and regular rhythm.      Pulses: Normal pulses.      Heart sounds: Normal heart sounds. No murmur  heard.     No gallop.   Pulmonary:      Effort: Pulmonary effort is normal. No respiratory distress.      Breath sounds: Normal breath sounds. No stridor. No wheezing, rhonchi or rales.   Abdominal:      General: Abdomen is flat.   Musculoskeletal:         General: Normal range of motion.   Skin:     General: Skin is warm and dry.      Capillary Refill: Capillary refill takes less than 2 seconds.   Neurological:      Mental Status: She is alert and oriented to person, place, and time.

## 2024-09-08 LAB — BACTERIA THROAT CULT: NORMAL

## 2024-09-09 LAB — BACTERIA THROAT CULT: NORMAL

## 2024-09-25 DIAGNOSIS — Z00.6 ENCOUNTER FOR EXAMINATION FOR NORMAL COMPARISON OR CONTROL IN CLINICAL RESEARCH PROGRAM: ICD-10-CM

## 2024-09-28 ENCOUNTER — APPOINTMENT (OUTPATIENT)
Dept: LAB | Facility: CLINIC | Age: 29
End: 2024-09-28

## 2024-09-28 DIAGNOSIS — Z00.6 ENCOUNTER FOR EXAMINATION FOR NORMAL COMPARISON OR CONTROL IN CLINICAL RESEARCH PROGRAM: ICD-10-CM

## 2024-09-28 PROCEDURE — 36415 COLL VENOUS BLD VENIPUNCTURE: CPT

## 2024-09-29 ENCOUNTER — HOSPITAL ENCOUNTER (EMERGENCY)
Facility: HOSPITAL | Age: 29
Discharge: HOME | End: 2024-09-29
Attending: EMERGENCY MEDICINE | Admitting: EMERGENCY MEDICINE
Payer: COMMERCIAL

## 2024-09-29 VITALS
RESPIRATION RATE: 18 BRPM | BODY MASS INDEX: 38.34 KG/M2 | HEIGHT: 62 IN | SYSTOLIC BLOOD PRESSURE: 131 MMHG | TEMPERATURE: 97.9 F | HEART RATE: 87 BPM | OXYGEN SATURATION: 99 % | WEIGHT: 208.34 LBS | DIASTOLIC BLOOD PRESSURE: 89 MMHG

## 2024-09-29 DIAGNOSIS — K64.9 HEMORRHOIDS: Primary | ICD-10-CM

## 2024-09-29 DIAGNOSIS — K92.1 BLOODY STOOL: ICD-10-CM

## 2024-09-29 PROCEDURE — 99283 EMERGENCY DEPT VISIT LOW MDM: CPT | Performed by: EMERGENCY MEDICINE

## 2024-09-29 PROCEDURE — 99284 EMERGENCY DEPT VISIT MOD MDM: CPT

## 2024-09-30 NOTE — ED PROVIDER NOTES
Final diagnoses:   Hemorrhoids   Bloody stool     ED Disposition       ED Disposition   Discharge    Condition   Stable    Date/Time   Sun Sep 29, 2024 10:24 PM    Comment   Ezio Marroquin discharge to home/self care.                   Assessment & Plan       Medical Decision Making  28 y/o female with bloody stool with known hemorrhoid. Offered blood work but does not want any at this time. Will d/c and refer to GI         ED Course as of 09/30/24 0104   Sun Sep 29, 2024   2213 Bright red blood in stool 4 days ago. No blood after until today. No straining. 1 episode today. No abd pain. Hx of hemorrhoids. - used preparation h because she can feel them and it was bleeding. And then had a bloody bowel movement. No pain. Hx of colonoscopy a year ago            Medications - No data to display    ED Risk Strat Scores                           SBIRT 22yo+      Flowsheet Row Most Recent Value   Initial Alcohol Screen: US AUDIT-C     1. How often do you have a drink containing alcohol? 0 Filed at: 09/29/2024 2149   2. How many drinks containing alcohol do you have on a typical day you are drinking?  0 Filed at: 09/29/2024 2149   3a. Male UNDER 65: How often do you have five or more drinks on one occasion? 0 Filed at: 09/29/2024 2149   3b. FEMALE Any Age, or MALE 65+: How often do you have 4 or more drinks on one occassion? 0 Filed at: 09/29/2024 2149   Audit-C Score 0 Filed at: 09/29/2024 2149   FARIDA: How many times in the past year have you...    Used an illegal drug or used a prescription medication for non-medical reasons? Never Filed at: 09/29/2024 2149                            History of Present Illness       Chief Complaint   Patient presents with    Black or Bloody Stool     Pt reports having moved her bowels twice this week and noticed blood. Tonight more blood than before. Hx of hemorrhoids per pt.        Past Medical History:   Diagnosis Date    Adult celiac disease     Last Assessed 25Jan2017      Past  Surgical History:   Procedure Laterality Date    HERNIA REPAIR        Family History   Problem Relation Age of Onset    Diabetes Maternal Grandfather     Hypertension Maternal Grandfather     Diabetes Paternal Grandmother     Hypertension Paternal Grandmother     Cancer Paternal Grandmother     Cancer Paternal Grandfather     Thyroid disease Mother     Asthma Sister     Thyroid disease Maternal Grandmother     Cancer Family     Hypertension Family     Thyroid disease Family     Hyperlipidemia Family     Diabetes Family         Type 2 DM with other ophthalmic complication      Social History     Tobacco Use    Smoking status: Every Day     Current packs/day: 0.25     Average packs/day: 0.3 packs/day for 0.7 years (0.2 ttl pk-yrs)     Types: Cigarettes     Start date: 2024     Last attempt to quit: 6/8/2018    Smokeless tobacco: Never   Vaping Use    Vaping status: Never Used   Substance Use Topics    Alcohol use: No     Comment: social    Drug use: No      E-Cigarette/Vaping    E-Cigarette Use Never User       E-Cigarette/Vaping Substances      I have reviewed and agree with the history as documented.     30 y/o female presents to the ED for bright red blood in stool. She states that she had an episode 4 days ago. States that symptoms resolved until today. She has a known hemorrhoid and reports that she noticed bleeding from it today. States that she then had a BM and noticed bright red blood in the toilet. She denies any abd or rectal pain. Denies any n/v, f/c, cp, sob, or urinary symptoms. States that she has a hx of prior colonoscopy a yr ago which was normal.       History provided by:  Patient      Review of Systems   Constitutional:  Negative for chills and fever.   HENT:  Negative for congestion, ear pain and sore throat.    Eyes:  Negative for pain and visual disturbance.   Respiratory:  Negative for cough, shortness of breath and wheezing.    Cardiovascular:  Negative for chest pain and leg swelling.    Gastrointestinal:  Positive for blood in stool. Negative for abdominal pain, diarrhea, nausea and vomiting.   Genitourinary:  Negative for dysuria, frequency, hematuria and urgency.   Musculoskeletal:  Negative for neck pain and neck stiffness.   Skin:  Negative for rash and wound.   Neurological:  Negative for weakness, numbness and headaches.   Psychiatric/Behavioral:  Negative for agitation and confusion.    All other systems reviewed and are negative.          Objective       ED Triage Vitals   Temperature Pulse Blood Pressure Respirations SpO2 Patient Position - Orthostatic VS   09/29/24 2147 09/29/24 2147 09/29/24 2147 09/29/24 2147 09/29/24 2147 09/29/24 2147   97.9 °F (36.6 °C) 87 131/89 18 99 % Sitting      Temp Source Heart Rate Source BP Location FiO2 (%) Pain Score    09/29/24 2147 09/29/24 2147 09/29/24 2147 -- 09/29/24 2149    Temporal Monitor Left arm  No Pain      Vitals      Date and Time Temp Pulse SpO2 Resp BP Pain Score FACES Pain Rating User   09/29/24 2149 -- -- -- -- -- No Pain -- VB   09/29/24 2147 97.9 °F (36.6 °C) 87 99 % 18 131/89 -- -- BS            Physical Exam  Vitals and nursing note reviewed.   Constitutional:       Appearance: She is well-developed.   HENT:      Head: Normocephalic and atraumatic.   Eyes:      Pupils: Pupils are equal, round, and reactive to light.   Cardiovascular:      Rate and Rhythm: Normal rate and regular rhythm.   Pulmonary:      Effort: Pulmonary effort is normal.      Breath sounds: Normal breath sounds.   Abdominal:      General: Bowel sounds are normal.      Palpations: Abdomen is soft.   Genitourinary:     Comments: External hemorrhoid- not thrombosed. No active bleeding   Musculoskeletal:         General: Normal range of motion.      Cervical back: Normal range of motion and neck supple.   Skin:     General: Skin is warm and dry.   Neurological:      General: No focal deficit present.      Mental Status: She is alert and oriented to person, place, and  time.      Comments: No focal deficits         Results Reviewed       None            No orders to display       Procedures    ED Medication and Procedure Management   Prior to Admission Medications   Prescriptions Last Dose Informant Patient Reported? Taking?   cetirizine (ZyrTEC) 10 mg tablet   No No   Sig: Take 1 tablet (10 mg total) by mouth daily      Facility-Administered Medications: None     Discharge Medication List as of 9/29/2024 10:25 PM        CONTINUE these medications which have NOT CHANGED    Details   cetirizine (ZyrTEC) 10 mg tablet Take 1 tablet (10 mg total) by mouth daily, Starting Sat 9/7/2024, Normal           No discharge procedures on file.  ED SEPSIS DOCUMENTATION   Time reflects when diagnosis was documented in both MDM as applicable and the Disposition within this note       Time User Action Codes Description Comment    9/29/2024 10:24 PM Sneha Brasher Add [K64.9] Hemorrhoids     9/29/2024 10:24 PM Sneha Brasher Add [K92.1] Bloody stool                  Sneha Brasher, DO  09/30/24 0104

## 2024-10-01 ENCOUNTER — OFFICE VISIT (OUTPATIENT)
Age: 29
End: 2024-10-01
Payer: COMMERCIAL

## 2024-10-01 ENCOUNTER — PREP FOR PROCEDURE (OUTPATIENT)
Age: 29
End: 2024-10-01

## 2024-10-01 VITALS
OXYGEN SATURATION: 98 % | WEIGHT: 208 LBS | SYSTOLIC BLOOD PRESSURE: 112 MMHG | HEART RATE: 89 BPM | HEIGHT: 62 IN | DIASTOLIC BLOOD PRESSURE: 80 MMHG | BODY MASS INDEX: 38.28 KG/M2

## 2024-10-01 DIAGNOSIS — R19.7 DIARRHEA, UNSPECIFIED TYPE: ICD-10-CM

## 2024-10-01 DIAGNOSIS — K92.1 HEMATOCHEZIA: Primary | ICD-10-CM

## 2024-10-01 PROCEDURE — 99204 OFFICE O/P NEW MOD 45 MIN: CPT | Performed by: INTERNAL MEDICINE

## 2024-10-01 NOTE — LETTER
October 1, 2024     YANCI Lindo  [address] [address]    Patient: zEio Marroquin   YOB: 1995   Date of Visit: 10/1/2024       Dear Dr. Olivera:    Thank you for referring Ezio Marroquin to me for evaluation. Below are my notes for this consultation.    If you have questions, please do not hesitate to call me. I look forward to following your patient along with you.         Sincerely,        Eugene Rodriguez MD        CC: No Recipients    Eugene Rodriguez MD  10/1/2024  3:48 PM  Incomplete  [facility]    Dear Dave,    I had the pleasure of seeing your patient Ezio Marroquin in the office today and I thank you for this kind referral.       Chief Complaint: Bleeding      HPI:  Ezio Marroquin is a 29 y.o. female who presents with an emergency room visit for multiple episodes of significant lower GI bleeding with red blood and clots.  Patient has a history of chronic diarrhea.  This goes back at least 5 years since the birth of her son.  She had undergone colonoscopy in [facility] a year ago apparently at that time nothing was found.  However she was treated for some bacterial infection of the colon at 1 time and did better after that.  She is not having any unwanted weight loss.  No fever or chills.  No melena.  No significant abdominal pain.  She has no other significant GI history.  No other significant complaints.  No nocturnal symptomatology..      Review of Systems:   Constitutional: No fever or chills, feels well, no tiredness, no recent weight gain or weight loss.   HENT: No complaints of earache, no hearing loss, no nosebleeds, no nasal discharge, no sore throat, no hoarseness.    Eyes: No complaints of eye pain, no red eyes, no discharge from eyes, no itchy eyes.  Cardiovascular: No complaints of slow heart rate, no fast heart rate, no chest pain, no palpitations, no leg claudication, no lower extremity edema.    Respiratory: No complaints of shortness of breath, no wheezing, no cough, no SOB on exertion, no orthopnea.   Gastrointestinal: As noted in HPI  Genitourinary: No complaints of dysuria, no incontinence, no hesitancy, no nocturia.   Musculoskeletal: No complaints of arthralgia, no myalgias, no joint swelling or stiffness, no limb pain or swelling.   Neurological: No complaints of headache, no confusion, no convulsions, no numbness or tingling, no dizziness or fainting, no limb weakness, no difficulty walking.    Skin: No complaints of skin rash or skin lesions, no itching, no skin wound, no dry skin.    Hematological/Lymphatic: No complaints of swollen glands, does not bleed easy.   Allergic/Immunologic: No immunocompromised state.  Endocrine:  No complaints of polyuria, no polydipsia.   Psychiatric/Behavioral: is not suicidal, no sleep disturbances, no anxiety or depression, no change in personality, no emotional problems.       Historical Information  Past Medical History:   Diagnosis Date   • Adult celiac disease     Last Assessed 25Jan2017     Past Surgical History:   Procedure Laterality Date   • ABDOMINAL SURGERY  08/17/2019   • COLONOSCOPY  10/13/2023   • HERNIA REPAIR       Social History  Social History     Substance and Sexual Activity   Alcohol Use No    Comment: social     Social History     Substance and Sexual Activity   Drug Use No     Social History     Tobacco Use   Smoking Status Light Smoker   • Current packs/day: 0.25   • Average packs/day: 0.2 packs/day for 9.9 years (2.5 ttl pk-yrs)   • Types: Cigarettes   • Start date: 2024   • Last attempt to quit: 6/8/2018   Smokeless Tobacco Never     Family History   Problem Relation Age of Onset   • Diabetes Maternal Grandfather    • Hypertension Maternal Grandfather    • Hearing loss Maternal Grandfather    • Diabetes Paternal Grandmother    • Hypertension Paternal Grandmother    • Cancer Paternal Grandmother    • Cancer Paternal Grandfather    •  "Thyroid disease Mother    • Asthma Sister    • Anemia Sister    • Depression Sister    • Thyroid disease Maternal Grandmother    • Cancer Family    • Hypertension Family    • Thyroid disease Family    • Hyperlipidemia Family    • Diabetes Family         Type 2 DM with other ophthalmic complication   • Breast cancer Paternal Aunt    • Depression Sister    • Miscarriages / Stillbirths Maternal Aunt    • Miscarriages / Stillbirths Paternal Aunt          Current Medications: has a current medication list which includes the following prescription(s): cetirizine.       Vital Signs: /80   Pulse 89   Ht 5' 2\" (1.575 m)   Wt 94.3 kg (208 lb)   SpO2 98%   BMI 38.04 kg/m²     Physical Exam:   Constitutional  General Appearance: No acute distress, well appearing and well nourished  Head  Normocephalic  Eyes  Conjunctivae and lids: No swelling, erythema, or discharge.    Pupils and irises: Equal, round and reactive to light.   Ears, Nose, Mouth, and Throat  External inspection of ears and nose: Normal  Nasal mucosa, septum and turbinates: Normal without edema or erythema/   Oropharynx: Normal with no erythema, edema, exudate or lesions.   Neck  Normal range of motion. Neck supple.   Cardiovascular  Auscultation of the heart: Normal rate and rhythm, normal S1 and S2 without murmurs.  Examination of the extremities for edema and/or varicosities: Normal  Pulmonary/Chest  Respiratory effort: No increased work of breathing or signs of respiratory distress.   Auscultation of lungs: Clear to auscultation, equal breath sounds bilaterally, no wheezes, rales, no rhonchi.   Abdomen  Abdomen: Non-tender, no masses.   Liver and spleen: No hepatomegaly or splenomegaly.   Musculoskeletal  Gait and station: normal.  Digits and Nails: normal without clubbing or cyanosis.  Inspection/palpation of joints, bones, and muscles: Normal  Neurological  No nystagmus or asterixis.   Skin  Skin and subcutaneous tissue: Normal without rashes or " lesions.   Lymphatic  Palpation of the lymph nodes in neck: No lymphadenopathy.   Psychiatric  Orientation to person, place and time: Normal.  Mood and affect: Normal.         Labs:   Lab Results   Component Value Date    ALT 18 04/07/2023    AST 16 04/07/2023    BUN 14 04/07/2023    CALCIUM 10.1 04/07/2023     04/07/2023    CO2 28 04/07/2023    CREATININE 0.74 04/07/2023    HCT 45.6 08/07/2018    HGB 14.4 08/07/2018     08/07/2018    K 4.2 04/07/2023    WBC 9.95 08/07/2018         X-Rays & Procedures:   No orders to display         ______________________________________________________________________      Assessment & Plan:      Diagnoses and all orders for this visit:    Hematochezia  -     Colonoscopy; Future    Diarrhea, unspecified type  -     Colonoscopy; Future  -     Calprotectin,Fecal; Future  -     Fecal leukocytes; Future  -     Ova and parasite examination; Future  -     Stool Enteric Bacterial Panel by PCR; Future        I have taken the liberty of scheduling the patient for colonoscopy.  We will also obtain stool testing.  I will be happy to inform you of her results and further recommendations.  I would like to thank you for allowing me to participate in her care.            With warmest regards,    Eugene Rodriguez MD, FACG

## 2024-10-01 NOTE — H&P (VIEW-ONLY)
[facility]    Dear Dave,    I had the pleasure of seeing your patient Ezio Marroquin in the office today and I thank you for this kind referral.       Chief Complaint: Bleeding      HPI:  Ezio Marroquin is a 29 y.o. female who presents with an emergency room visit for multiple episodes of significant lower GI bleeding with red blood and clots.  Patient has a history of chronic diarrhea.  This goes back at least 5 years since the birth of her son.  She had undergone colonoscopy in [facility] a year ago apparently at that time nothing was found.  However she was treated for some bacterial infection of the colon at 1 time and did better after that.  She is not having any unwanted weight loss.  No fever or chills.  No melena.  No significant abdominal pain.  She has no other significant GI history.  No other significant complaints.  No nocturnal symptomatology..      Review of Systems:   Constitutional: No fever or chills, feels well, no tiredness, no recent weight gain or weight loss.   HENT: No complaints of earache, no hearing loss, no nosebleeds, no nasal discharge, no sore throat, no hoarseness.    Eyes: No complaints of eye pain, no red eyes, no discharge from eyes, no itchy eyes.  Cardiovascular: No complaints of slow heart rate, no fast heart rate, no chest pain, no palpitations, no leg claudication, no lower extremity edema.   Respiratory: No complaints of shortness of breath, no wheezing, no cough, no SOB on exertion, no orthopnea.   Gastrointestinal: As noted in HPI  Genitourinary: No complaints of dysuria, no incontinence, no hesitancy, no nocturia.   Musculoskeletal: No complaints of arthralgia, no myalgias, no joint swelling or stiffness, no limb pain or swelling.   Neurological: No complaints of headache, no confusion, no convulsions, no numbness or tingling, no dizziness or fainting, no limb weakness, no difficulty walking.    Skin: No complaints of skin rash or skin  lesions, no itching, no skin wound, no dry skin.    Hematological/Lymphatic: No complaints of swollen glands, does not bleed easy.   Allergic/Immunologic: No immunocompromised state.  Endocrine:  No complaints of polyuria, no polydipsia.   Psychiatric/Behavioral: is not suicidal, no sleep disturbances, no anxiety or depression, no change in personality, no emotional problems.       Historical Information   Past Medical History:   Diagnosis Date    Adult celiac disease     Last Assessed 25Jan2017     Past Surgical History:   Procedure Laterality Date    ABDOMINAL SURGERY  08/17/2019    COLONOSCOPY  10/13/2023    HERNIA REPAIR       Social History   Social History     Substance and Sexual Activity   Alcohol Use No    Comment: social     Social History     Substance and Sexual Activity   Drug Use No     Social History     Tobacco Use   Smoking Status Light Smoker    Current packs/day: 0.25    Average packs/day: 0.2 packs/day for 9.9 years (2.5 ttl pk-yrs)    Types: Cigarettes    Start date: 2024    Last attempt to quit: 6/8/2018   Smokeless Tobacco Never     Family History   Problem Relation Age of Onset    Diabetes Maternal Grandfather     Hypertension Maternal Grandfather     Hearing loss Maternal Grandfather     Diabetes Paternal Grandmother     Hypertension Paternal Grandmother     Cancer Paternal Grandmother     Cancer Paternal Grandfather     Thyroid disease Mother     Asthma Sister     Anemia Sister     Depression Sister     Thyroid disease Maternal Grandmother     Cancer Family     Hypertension Family     Thyroid disease Family     Hyperlipidemia Family     Diabetes Family         Type 2 DM with other ophthalmic complication    Breast cancer Paternal Aunt     Depression Sister     Miscarriages / Stillbirths Maternal Aunt     Miscarriages / Stillbirths Paternal Aunt          Current Medications: has a current medication list which includes the following prescription(s): cetirizine.       Vital Signs: /80   " Pulse 89   Ht 5' 2\" (1.575 m)   Wt 94.3 kg (208 lb)   SpO2 98%   BMI 38.04 kg/m²     Physical Exam:   Constitutional  General Appearance: No acute distress, well appearing and well nourished  Head  Normocephalic  Eyes  Conjunctivae and lids: No swelling, erythema, or discharge.    Pupils and irises: Equal, round and reactive to light.   Ears, Nose, Mouth, and Throat  External inspection of ears and nose: Normal  Nasal mucosa, septum and turbinates: Normal without edema or erythema/   Oropharynx: Normal with no erythema, edema, exudate or lesions.   Neck  Normal range of motion. Neck supple.   Cardiovascular  Auscultation of the heart: Normal rate and rhythm, normal S1 and S2 without murmurs.  Examination of the extremities for edema and/or varicosities: Normal  Pulmonary/Chest  Respiratory effort: No increased work of breathing or signs of respiratory distress.   Auscultation of lungs: Clear to auscultation, equal breath sounds bilaterally, no wheezes, rales, no rhonchi.   Abdomen  Abdomen: Non-tender, no masses.   Liver and spleen: No hepatomegaly or splenomegaly.   Musculoskeletal  Gait and station: normal.  Digits and Nails: normal without clubbing or cyanosis.  Inspection/palpation of joints, bones, and muscles: Normal  Neurological  No nystagmus or asterixis.   Skin  Skin and subcutaneous tissue: Normal without rashes or lesions.   Lymphatic  Palpation of the lymph nodes in neck: No lymphadenopathy.   Psychiatric  Orientation to person, place and time: Normal.  Mood and affect: Normal.         Labs:   Lab Results   Component Value Date    ALT 18 04/07/2023    AST 16 04/07/2023    BUN 14 04/07/2023    CALCIUM 10.1 04/07/2023     04/07/2023    CO2 28 04/07/2023    CREATININE 0.74 04/07/2023    HCT 45.6 08/07/2018    HGB 14.4 08/07/2018     08/07/2018    K 4.2 04/07/2023    WBC 9.95 08/07/2018         X-Rays & Procedures:   No orders to display "         ______________________________________________________________________      Assessment & Plan:      Diagnoses and all orders for this visit:    Hematochezia  -     Colonoscopy; Future    Diarrhea, unspecified type  -     Colonoscopy; Future  -     Calprotectin,Fecal; Future  -     Fecal leukocytes; Future  -     Ova and parasite examination; Future  -     Stool Enteric Bacterial Panel by PCR; Future        I have taken the liberty of scheduling the patient for colonoscopy.  We will also obtain stool testing.  I will be happy to inform you of her results and further recommendations.  I would like to thank you for allowing me to participate in her care.            With warmest regards,    Eugene Rodriguez MD, FACG

## 2024-10-01 NOTE — PROGRESS NOTES
[facility]    Dear Dave,    I had the pleasure of seeing your patient Ezio Marroquin in the office today and I thank you for this kind referral.       Chief Complaint: Bleeding      HPI:  Ezio Marroquin is a 29 y.o. female who presents with an emergency room visit for multiple episodes of significant lower GI bleeding with red blood and clots.  Patient has a history of chronic diarrhea.  This goes back at least 5 years since the birth of her son.  She had undergone colonoscopy in [facility] a year ago apparently at that time nothing was found.  However she was treated for some bacterial infection of the colon at 1 time and did better after that.  She is not having any unwanted weight loss.  No fever or chills.  No melena.  No significant abdominal pain.  She has no other significant GI history.  No other significant complaints.  No nocturnal symptomatology..      Review of Systems:   Constitutional: No fever or chills, feels well, no tiredness, no recent weight gain or weight loss.   HENT: No complaints of earache, no hearing loss, no nosebleeds, no nasal discharge, no sore throat, no hoarseness.    Eyes: No complaints of eye pain, no red eyes, no discharge from eyes, no itchy eyes.  Cardiovascular: No complaints of slow heart rate, no fast heart rate, no chest pain, no palpitations, no leg claudication, no lower extremity edema.   Respiratory: No complaints of shortness of breath, no wheezing, no cough, no SOB on exertion, no orthopnea.   Gastrointestinal: As noted in HPI  Genitourinary: No complaints of dysuria, no incontinence, no hesitancy, no nocturia.   Musculoskeletal: No complaints of arthralgia, no myalgias, no joint swelling or stiffness, no limb pain or swelling.   Neurological: No complaints of headache, no confusion, no convulsions, no numbness or tingling, no dizziness or fainting, no limb weakness, no difficulty walking.    Skin: No complaints of skin rash or skin  lesions, no itching, no skin wound, no dry skin.    Hematological/Lymphatic: No complaints of swollen glands, does not bleed easy.   Allergic/Immunologic: No immunocompromised state.  Endocrine:  No complaints of polyuria, no polydipsia.   Psychiatric/Behavioral: is not suicidal, no sleep disturbances, no anxiety or depression, no change in personality, no emotional problems.       Historical Information   Past Medical History:   Diagnosis Date    Adult celiac disease     Last Assessed [id number]     Past Surgical History:   Procedure Laterality Date    ABDOMINAL SURGERY  08/17/2019    COLONOSCOPY  10/13/2023    HERNIA REPAIR       Social History   Social History     Substance and Sexual Activity   Alcohol Use No    Comment: social     Social History     Substance and Sexual Activity   Drug Use No     Social History     Tobacco Use   Smoking Status Light Smoker    Current packs/day: 0.25    Average packs/day: 0.2 packs/day for 9.9 years (2.5 ttl pk-yrs)    Types: Cigarettes    Start date: 2024    Last attempt to quit: 6/8/2018   Smokeless Tobacco Never     Family History   Problem Relation Age of Onset    Diabetes Maternal Grandfather     Hypertension Maternal Grandfather     Hearing loss Maternal Grandfather     Diabetes Paternal Grandmother     Hypertension Paternal Grandmother     Cancer Paternal Grandmother     Cancer Paternal Grandfather     Thyroid disease Mother     Asthma Sister     Anemia Sister     Depression Sister     Thyroid disease Maternal Grandmother     Cancer Family     Hypertension Family     Thyroid disease Family     Hyperlipidemia Family     Diabetes Family         Type 2 DM with other ophthalmic complication    Breast cancer Paternal Aunt     Depression Sister     Miscarriages / Stillbirths Maternal Aunt     Miscarriages / Stillbirths Paternal Aunt          Current Medications: has a current medication list which includes the following prescription(s): cetirizine.       Vital Signs: /80   " Pulse 89   Ht 5' 2\" (1.575 m)   Wt 94.3 kg (208 lb)   SpO2 98%   BMI 38.04 kg/m²     Physical Exam:   Constitutional  General Appearance: No acute distress, well appearing and well nourished  Head  Normocephalic  Eyes  Conjunctivae and lids: No swelling, erythema, or discharge.    Pupils and irises: Equal, round and reactive to light.   Ears, Nose, Mouth, and Throat  External inspection of ears and nose: Normal  Nasal mucosa, septum and turbinates: Normal without edema or erythema/   Oropharynx: Normal with no erythema, edema, exudate or lesions.   Neck  Normal range of motion. Neck supple.   Cardiovascular  Auscultation of the heart: Normal rate and rhythm, normal S1 and S2 without murmurs.  Examination of the extremities for edema and/or varicosities: Normal  Pulmonary/Chest  Respiratory effort: No increased work of breathing or signs of respiratory distress.   Auscultation of lungs: Clear to auscultation, equal breath sounds bilaterally, no wheezes, rales, no rhonchi.   Abdomen  Abdomen: Non-tender, no masses.   Liver and spleen: No hepatomegaly or splenomegaly.   Musculoskeletal  Gait and station: normal.  Digits and Nails: normal without clubbing or cyanosis.  Inspection/palpation of joints, bones, and muscles: Normal  Neurological  No nystagmus or asterixis.   Skin  Skin and subcutaneous tissue: Normal without rashes or lesions.   Lymphatic  Palpation of the lymph nodes in neck: No lymphadenopathy.   Psychiatric  Orientation to person, place and time: Normal.  Mood and affect: Normal.         Labs:   Lab Results   Component Value Date    ALT 18 04/07/2023    AST 16 04/07/2023    BUN 14 04/07/2023    CALCIUM 10.1 04/07/2023     04/07/2023    CO2 28 04/07/2023    CREATININE 0.74 04/07/2023    HCT 45.6 08/07/2018    HGB 14.4 08/07/2018     08/07/2018    K 4.2 04/07/2023    WBC 9.95 08/07/2018         X-Rays & Procedures:   No orders to display "         ______________________________________________________________________      Assessment & Plan:      Diagnoses and all orders for this visit:    Hematochezia  -     Colonoscopy; Future    Diarrhea, unspecified type  -     Colonoscopy; Future  -     Calprotectin,Fecal; Future  -     Fecal leukocytes; Future  -     Ova and parasite examination; Future  -     Stool Enteric Bacterial Panel by PCR; Future        I have taken the liberty of scheduling the patient for colonoscopy.  We will also obtain stool testing.  I will be happy to inform you of her results and further recommendations.  I would like to thank you for allowing me to participate in her care.            With warmest regards,    Eugene Rodriguez MD, FACG

## 2024-10-01 NOTE — PATIENT INSTRUCTIONS
Scheduled date of colonoscopy (as of today): 10/5/24  Physician performing colonoscopy: Jennifer  Location of colonoscopy: [place]  Bowel prep reviewed with patient: Miralax  Instructions reviewed with patient by: Mercedes GABRIEL  Clearances:

## 2024-10-03 ENCOUNTER — APPOINTMENT (OUTPATIENT)
Dept: LAB | Facility: CLINIC | Age: 29
End: 2024-10-03

## 2024-10-05 ENCOUNTER — HOSPITAL ENCOUNTER (OUTPATIENT)
Dept: GASTROENTEROLOGY | Facility: HOSPITAL | Age: 29
Setting detail: OUTPATIENT SURGERY
Discharge: HOME | End: 2024-10-05
Attending: INTERNAL MEDICINE
Payer: COMMERCIAL

## 2024-10-05 ENCOUNTER — ANESTHESIA (OUTPATIENT)
Dept: GASTROENTEROLOGY | Facility: HOSPITAL | Age: 29
End: 2024-10-05
Payer: COMMERCIAL

## 2024-10-05 ENCOUNTER — ANESTHESIA EVENT (OUTPATIENT)
Dept: GASTROENTEROLOGY | Facility: HOSPITAL | Age: 29
End: 2024-10-05
Payer: COMMERCIAL

## 2024-10-05 ENCOUNTER — APPOINTMENT (OUTPATIENT)
Dept: LAB | Facility: CLINIC | Age: 29
End: 2024-10-05
Payer: COMMERCIAL

## 2024-10-05 VITALS
SYSTOLIC BLOOD PRESSURE: 127 MMHG | TEMPERATURE: 97.9 F | DIASTOLIC BLOOD PRESSURE: 80 MMHG | HEART RATE: 66 BPM | RESPIRATION RATE: 30 BRPM | OXYGEN SATURATION: 100 % | HEIGHT: 62 IN | WEIGHT: 205.25 LBS | BODY MASS INDEX: 37.77 KG/M2

## 2024-10-05 DIAGNOSIS — R19.7 DIARRHEA, UNSPECIFIED TYPE: ICD-10-CM

## 2024-10-05 DIAGNOSIS — K92.1 HEMATOCHEZIA: ICD-10-CM

## 2024-10-05 PROCEDURE — 87177 OVA AND PARASITES SMEARS: CPT

## 2024-10-05 PROCEDURE — 88305 TISSUE EXAM BY PATHOLOGIST: CPT | Performed by: SPECIALIST

## 2024-10-05 PROCEDURE — 83993 ASSAY FOR CALPROTECTIN FECAL: CPT

## 2024-10-05 PROCEDURE — 87209 SMEAR COMPLEX STAIN: CPT

## 2024-10-05 PROCEDURE — 45380 COLONOSCOPY AND BIOPSY: CPT | Performed by: INTERNAL MEDICINE

## 2024-10-05 PROCEDURE — 87505 NFCT AGENT DETECTION GI: CPT

## 2024-10-05 PROCEDURE — 89055 LEUKOCYTE ASSESSMENT FECAL: CPT

## 2024-10-05 RX ORDER — LIDOCAINE HYDROCHLORIDE 10 MG/ML
INJECTION, SOLUTION EPIDURAL; INFILTRATION; INTRACAUDAL; PERINEURAL AS NEEDED
Status: DISCONTINUED | OUTPATIENT
Start: 2024-10-05 | End: 2024-10-05

## 2024-10-05 RX ORDER — SODIUM CHLORIDE, SODIUM LACTATE, POTASSIUM CHLORIDE, CALCIUM CHLORIDE 600; 310; 30; 20 MG/100ML; MG/100ML; MG/100ML; MG/100ML
INJECTION, SOLUTION INTRAVENOUS CONTINUOUS PRN
Status: DISCONTINUED | OUTPATIENT
Start: 2024-10-05 | End: 2024-10-05

## 2024-10-05 RX ORDER — PROPOFOL 10 MG/ML
INJECTION, EMULSION INTRAVENOUS AS NEEDED
Status: DISCONTINUED | OUTPATIENT
Start: 2024-10-05 | End: 2024-10-05

## 2024-10-05 RX ADMIN — PROPOFOL 50 MG: 10 INJECTION, EMULSION INTRAVENOUS at 10:47

## 2024-10-05 RX ADMIN — SODIUM CHLORIDE, SODIUM LACTATE, POTASSIUM CHLORIDE, AND CALCIUM CHLORIDE: .6; .31; .03; .02 INJECTION, SOLUTION INTRAVENOUS at 10:17

## 2024-10-05 RX ADMIN — PROPOFOL 50 MG: 10 INJECTION, EMULSION INTRAVENOUS at 10:44

## 2024-10-05 RX ADMIN — LIDOCAINE HYDROCHLORIDE 50 MG: 10 INJECTION, SOLUTION EPIDURAL; INFILTRATION; INTRACAUDAL; PERINEURAL at 10:39

## 2024-10-05 RX ADMIN — PROPOFOL 50 MG: 10 INJECTION, EMULSION INTRAVENOUS at 10:43

## 2024-10-05 RX ADMIN — PROPOFOL 100 MG: 10 INJECTION, EMULSION INTRAVENOUS at 10:39

## 2024-10-05 RX ADMIN — SODIUM CHLORIDE, SODIUM LACTATE, POTASSIUM CHLORIDE, AND CALCIUM CHLORIDE: .6; .31; .03; .02 INJECTION, SOLUTION INTRAVENOUS at 10:39

## 2024-10-05 RX ADMIN — PROPOFOL 50 MG: 10 INJECTION, EMULSION INTRAVENOUS at 10:41

## 2024-10-05 NOTE — INTERVAL H&P NOTE
H&P reviewed. After examining the patient I find no changes in the patients condition since the H&P had been written.    Vitals:    10/05/24 0929   BP: 120/82   Pulse: 88   Resp: 18   Temp: 97.7 °F (36.5 °C)   SpO2: 99%

## 2024-10-05 NOTE — ANESTHESIA POSTPROCEDURE EVALUATION
Post-Op Assessment Note    CV Status:  Stable    Pain management: satisfactory to patient       Mental Status:  Awake and sleepy   Hydration Status:  Euvolemic   PONV Controlled:  Controlled   Airway Patency:  Patent     Post Op Vitals Reviewed: Yes    No anethesia notable event occurred.    Staff: Anesthesiologist, CRNA               /70 (10/05/24 1056)    Temp 97.9 °F (36.6 °C) (10/05/24 1056)    Pulse 81 (10/05/24 1056)   Resp (!) 30 (10/05/24 1056)    SpO2 98 % (10/05/24 1056)

## 2024-10-05 NOTE — ANESTHESIA PREPROCEDURE EVALUATION
Procedure:  COLONOSCOPY    Relevant Problems   No relevant active problems      BMI 37    Physical Exam    Airway  Comment: Bruising of lips - pt recently had lip filler placed   Mallampati score: II  TM Distance: >3 FB  Neck ROM: full     Dental   No notable dental hx     Cardiovascular      Pulmonary      Other Findings  post-pubertal.      Anesthesia Plan  ASA Score- 2     Anesthesia Type- IV sedation with anesthesia with ASA Monitors.         Additional Monitors:     Airway Plan:            Plan Factors-Exercise tolerance (METS): >4 METS.    Chart reviewed.    Patient summary reviewed.                  Induction- intravenous.    Postoperative Plan-     Perioperative Resuscitation Plan - Level 1 - Full Code.       Informed Consent- Anesthetic plan and risks discussed with patient.  I personally reviewed this patient with the CRNA. Discussed and agreed on the Anesthesia Plan with the CRNA..

## 2024-10-06 LAB
C COLI+JEJUNI TUF STL QL NAA+PROBE: NEGATIVE
EC STX1+STX2 GENES STL QL NAA+PROBE: NEGATIVE
SALMONELLA SP SPAO STL QL NAA+PROBE: NEGATIVE
SHIGELLA SP+EIEC IPAH STL QL NAA+PROBE: NEGATIVE

## 2024-10-07 LAB — CALPROTECTIN STL-MCNC: 6.9 ΜG/G

## 2024-10-11 LAB — WBC SPEC QL GRAM STN: NORMAL

## 2024-10-15 LAB
APOB+LDLR+PCSK9 GENE MUT ANL BLD/T: NOT DETECTED
BRCA1+BRCA2 DEL+DUP + FULL MUT ANL BLD/T: NOT DETECTED
MLH1+MSH2+MSH6+PMS2 GN DEL+DUP+FUL M: NOT DETECTED